# Patient Record
Sex: MALE | Race: WHITE | NOT HISPANIC OR LATINO | Employment: FULL TIME | URBAN - METROPOLITAN AREA
[De-identification: names, ages, dates, MRNs, and addresses within clinical notes are randomized per-mention and may not be internally consistent; named-entity substitution may affect disease eponyms.]

---

## 2017-03-01 ENCOUNTER — ALLSCRIPTS OFFICE VISIT (OUTPATIENT)
Dept: OTHER | Facility: OTHER | Age: 62
End: 2017-03-01

## 2017-03-01 ENCOUNTER — HOSPITAL ENCOUNTER (OUTPATIENT)
Dept: RADIOLOGY | Facility: MEDICAL CENTER | Age: 62
Discharge: HOME | End: 2017-03-01
Payer: COMMERCIAL

## 2017-03-01 DIAGNOSIS — M25.50 PAIN IN JOINT: ICD-10-CM

## 2017-03-01 DIAGNOSIS — M17.11 PRIMARY OSTEOARTHRITIS OF RIGHT KNEE: ICD-10-CM

## 2017-03-01 PROCEDURE — 73562 X-RAY EXAM OF KNEE 3: CPT

## 2017-05-02 ENCOUNTER — ALLSCRIPTS OFFICE VISIT (OUTPATIENT)
Dept: OTHER | Facility: OTHER | Age: 62
End: 2017-05-02

## 2017-09-08 ENCOUNTER — RX ONLY (RX ONLY)
Age: 62
End: 2017-09-08

## 2017-09-08 ENCOUNTER — DOCTOR'S OFFICE (OUTPATIENT)
Dept: URBAN - METROPOLITAN AREA CLINIC 137 | Facility: CLINIC | Age: 62
Setting detail: OPHTHALMOLOGY
End: 2017-09-08
Payer: COMMERCIAL

## 2017-09-08 ENCOUNTER — DOCTOR'S OFFICE (OUTPATIENT)
Dept: URBAN - NONMETROPOLITAN AREA CLINIC 1 | Facility: CLINIC | Age: 62
Setting detail: OPHTHALMOLOGY
End: 2017-09-08
Payer: COMMERCIAL

## 2017-09-08 DIAGNOSIS — H43.12: ICD-10-CM

## 2017-09-08 DIAGNOSIS — H25.13: ICD-10-CM

## 2017-09-08 DIAGNOSIS — H18.59: ICD-10-CM

## 2017-09-08 PROCEDURE — 92004 COMPRE OPH EXAM NEW PT 1/>: CPT | Performed by: OPHTHALMOLOGY

## 2017-09-08 PROCEDURE — NO CHARGE N/C PROFESSIONAL COURTESY: Performed by: OPHTHALMOLOGY

## 2017-09-08 PROCEDURE — 76512 OPH US DX B-SCAN: CPT | Performed by: OPHTHALMOLOGY

## 2017-09-08 ASSESSMENT — REFRACTION_AUTOREFRACTION
OS_SPHERE: +1.00
OD_CYLINDER: -1.50
OS_CYLINDER: -2.75
OS_AXIS: 145
OD_SPHERE: +0.75
OD_AXIS: 130

## 2017-09-08 ASSESSMENT — REFRACTION_CURRENTRX
OS_OVR_VA: 20/
OD_OVR_VA: 20/
OS_OVR_VA: 20/
OS_OVR_VA: 20/

## 2017-09-08 ASSESSMENT — REFRACTION_MANIFEST
OS_VA3: 20/
OS_VA1: 20/
OS_VA2: 20/
OS_VA2: 20/
OU_VA: 20/
OS_VA3: 20/
OD_VA3: 20/
OS_VA2: 20/
OD_VA2: 20/
OD_VA1: 20/
OU_VA: 20/
OU_VA: 20/
OD_VA1: 20/
OS_VA1: 20/
OS_VA1: 20/
OD_VA3: 20/
OD_VA1: 20/
OD_VA2: 20/
OD_VA2: 20/
OS_VA3: 20/
OD_VA3: 20/

## 2017-09-08 ASSESSMENT — SPHEQUIV_DERIVED
OD_SPHEQUIV: 0
OS_SPHEQUIV: -0.375

## 2017-09-08 ASSESSMENT — VISUAL ACUITY
OS_BCVA: 20/30
OD_BCVA: CF 1FT

## 2017-09-08 ASSESSMENT — CONFRONTATIONAL VISUAL FIELD TEST (CVF)
OD_FINDINGS: FULL
OS_FINDINGS: CONSTRICTION

## 2017-09-13 ENCOUNTER — DOCTOR'S OFFICE (OUTPATIENT)
Dept: URBAN - METROPOLITAN AREA CLINIC 136 | Facility: CLINIC | Age: 62
Setting detail: OPHTHALMOLOGY
End: 2017-09-13
Payer: COMMERCIAL

## 2017-09-13 DIAGNOSIS — H43.12: ICD-10-CM

## 2017-09-13 DIAGNOSIS — H25.13: ICD-10-CM

## 2017-09-13 DIAGNOSIS — H33.312: ICD-10-CM

## 2017-09-13 DIAGNOSIS — H18.59: ICD-10-CM

## 2017-09-13 PROCEDURE — 99213 OFFICE O/P EST LOW 20 MIN: CPT | Performed by: OPHTHALMOLOGY

## 2017-09-13 PROCEDURE — 67145 PROPH RTA DTCHMNT PC: CPT | Performed by: OPHTHALMOLOGY

## 2017-09-13 ASSESSMENT — REFRACTION_CURRENTRX
OD_OVR_VA: 20/
OD_OVR_VA: 20/
OS_OVR_VA: 20/
OS_OVR_VA: 20/
OD_OVR_VA: 20/
OS_OVR_VA: 20/

## 2017-09-13 ASSESSMENT — REFRACTION_MANIFEST
OD_VA3: 20/
OD_VA2: 20/
OD_VA3: 20/
OS_VA1: 20/
OS_VA2: 20/
OS_VA1: 20/
OS_VA3: 20/
OD_VA3: 20/
OS_VA3: 20/
OS_VA3: 20/
OD_VA1: 20/
OU_VA: 20/
OS_VA2: 20/
OU_VA: 20/
OD_VA1: 20/
OD_VA2: 20/
OD_VA1: 20/
OS_VA1: 20/
OU_VA: 20/
OS_VA2: 20/
OD_VA2: 20/

## 2017-09-13 ASSESSMENT — VISUAL ACUITY
OS_BCVA: 20/30
OD_BCVA: CF 1FT

## 2017-09-13 ASSESSMENT — REFRACTION_AUTOREFRACTION
OD_SPHERE: +0.75
OS_CYLINDER: -2.75
OS_AXIS: 145
OS_SPHERE: +1.00
OD_CYLINDER: -1.50
OD_AXIS: 130

## 2017-09-13 ASSESSMENT — CONFRONTATIONAL VISUAL FIELD TEST (CVF)
OS_FINDINGS: CONSTRICTION
OD_FINDINGS: FULL

## 2017-09-13 ASSESSMENT — SPHEQUIV_DERIVED
OS_SPHEQUIV: -0.375
OD_SPHEQUIV: 0

## 2017-09-14 ASSESSMENT — SPHEQUIV_DERIVED
OS_SPHEQUIV: -0.375
OD_SPHEQUIV: 0

## 2017-09-14 ASSESSMENT — REFRACTION_AUTOREFRACTION
OD_SPHERE: +0.75
OS_SPHERE: +1.00
OS_AXIS: 145
OD_CYLINDER: -1.50
OS_CYLINDER: -2.75
OD_AXIS: 130

## 2017-09-14 ASSESSMENT — REFRACTION_MANIFEST
OD_VA2: 20/
OD_VA2: 20/
OD_VA1: 20/
OU_VA: 20/
OS_VA3: 20/
OS_VA2: 20/
OD_VA1: 20/
OS_VA3: 20/
OS_VA3: 20/
OS_VA1: 20/
OS_VA1: 20/
OD_VA2: 20/
OS_VA2: 20/
OU_VA: 20/
OD_VA1: 20/
OS_VA1: 20/
OD_VA3: 20/
OU_VA: 20/
OS_VA2: 20/
OD_VA3: 20/
OD_VA3: 20/

## 2017-09-14 ASSESSMENT — REFRACTION_CURRENTRX
OS_OVR_VA: 20/
OD_OVR_VA: 20/
OS_OVR_VA: 20/
OD_OVR_VA: 20/
OS_OVR_VA: 20/
OD_OVR_VA: 20/

## 2017-09-14 ASSESSMENT — VISUAL ACUITY
OS_BCVA: 20/30-2
OD_BCVA: 20/40-2

## 2017-09-20 ENCOUNTER — DOCTOR'S OFFICE (OUTPATIENT)
Dept: URBAN - METROPOLITAN AREA CLINIC 136 | Facility: CLINIC | Age: 62
Setting detail: OPHTHALMOLOGY
End: 2017-09-20
Payer: COMMERCIAL

## 2017-09-20 DIAGNOSIS — H33.312: ICD-10-CM

## 2017-09-20 DIAGNOSIS — H43.12: ICD-10-CM

## 2017-09-20 DIAGNOSIS — H18.59: ICD-10-CM

## 2017-09-20 DIAGNOSIS — H25.13: ICD-10-CM

## 2017-09-20 PROCEDURE — 99024 POSTOP FOLLOW-UP VISIT: CPT | Performed by: OPHTHALMOLOGY

## 2017-09-20 ASSESSMENT — REFRACTION_AUTOREFRACTION
OS_CYLINDER: -2.75
OD_CYLINDER: -1.50
OD_AXIS: 130
OS_SPHERE: +1.00
OS_AXIS: 145
OD_SPHERE: +0.75

## 2017-09-20 ASSESSMENT — REFRACTION_MANIFEST
OU_VA: 20/
OD_VA3: 20/
OU_VA: 20/
OS_VA2: 20/
OS_VA1: 20/
OD_VA2: 20/
OD_VA1: 20/
OD_VA3: 20/
OU_VA: 20/
OD_VA3: 20/
OD_VA2: 20/
OD_VA1: 20/
OS_VA3: 20/
OS_VA1: 20/
OD_VA2: 20/
OS_VA2: 20/
OS_VA3: 20/
OD_VA1: 20/
OS_VA1: 20/
OS_VA3: 20/
OS_VA2: 20/

## 2017-09-20 ASSESSMENT — REFRACTION_CURRENTRX
OS_OVR_VA: 20/
OD_OVR_VA: 20/
OD_OVR_VA: 20/
OS_OVR_VA: 20/
OS_OVR_VA: 20/
OD_OVR_VA: 20/

## 2017-09-20 ASSESSMENT — VISUAL ACUITY
OS_BCVA: 20/
OD_BCVA: 20/20

## 2017-09-20 ASSESSMENT — SPHEQUIV_DERIVED
OD_SPHEQUIV: 0
OS_SPHEQUIV: -0.375

## 2017-10-11 ENCOUNTER — DOCTOR'S OFFICE (OUTPATIENT)
Dept: URBAN - METROPOLITAN AREA CLINIC 136 | Facility: CLINIC | Age: 62
Setting detail: OPHTHALMOLOGY
End: 2017-10-11
Payer: COMMERCIAL

## 2017-10-11 ENCOUNTER — RX ONLY (RX ONLY)
Age: 62
End: 2017-10-11

## 2017-10-11 DIAGNOSIS — H33.312: ICD-10-CM

## 2017-10-11 PROBLEM — H25.13 CATARACT NUCLEAR SCLEROSIS; BOTH EYES: Status: ACTIVE | Noted: 2017-09-08

## 2017-10-11 PROBLEM — H18.59 ANTERIOR CORNEAL DYSTROPHIES, OTHER: Status: ACTIVE | Noted: 2017-09-08

## 2017-10-11 PROBLEM — H43.12 VITREOUS HEMORRHAGE; LEFT EYE: Status: ACTIVE | Noted: 2017-09-08

## 2017-10-11 PROCEDURE — 99024 POSTOP FOLLOW-UP VISIT: CPT | Performed by: OPHTHALMOLOGY

## 2017-10-11 ASSESSMENT — REFRACTION_MANIFEST
OS_VA2: 20/
OD_VA2: 20/
OS_VA3: 20/
OD_VA1: 20/
OS_VA2: 20/
OS_VA3: 20/
OD_VA3: 20/
OS_VA1: 20/
OD_VA2: 20/
OD_VA3: 20/
OS_VA1: 20/
OU_VA: 20/
OS_VA3: 20/
OU_VA: 20/
OS_VA2: 20/
OS_VA1: 20/
OU_VA: 20/
OD_VA1: 20/
OD_VA2: 20/
OD_VA1: 20/
OD_VA3: 20/

## 2017-10-11 ASSESSMENT — REFRACTION_AUTOREFRACTION
OD_SPHERE: +0.75
OD_AXIS: 130
OS_SPHERE: +1.00
OS_AXIS: 145
OD_CYLINDER: -1.50
OS_CYLINDER: -2.75

## 2017-10-11 ASSESSMENT — REFRACTION_CURRENTRX
OD_OVR_VA: 20/
OS_OVR_VA: 20/
OD_OVR_VA: 20/
OS_OVR_VA: 20/
OD_OVR_VA: 20/
OS_OVR_VA: 20/

## 2017-10-11 ASSESSMENT — VISUAL ACUITY
OD_BCVA: 20/20
OS_BCVA: 20/

## 2017-10-11 ASSESSMENT — SPHEQUIV_DERIVED
OD_SPHEQUIV: 0
OS_SPHEQUIV: -0.375

## 2017-10-11 ASSESSMENT — CONFRONTATIONAL VISUAL FIELD TEST (CVF)
OD_FINDINGS: FULL
OS_FINDINGS: FULL

## 2018-01-13 VITALS
SYSTOLIC BLOOD PRESSURE: 155 MMHG | BODY MASS INDEX: 27.08 KG/M2 | DIASTOLIC BLOOD PRESSURE: 93 MMHG | HEIGHT: 74 IN | WEIGHT: 211 LBS | HEART RATE: 94 BPM

## 2018-01-14 VITALS
WEIGHT: 211 LBS | HEIGHT: 74 IN | BODY MASS INDEX: 27.08 KG/M2 | HEART RATE: 109 BPM | SYSTOLIC BLOOD PRESSURE: 166 MMHG | DIASTOLIC BLOOD PRESSURE: 98 MMHG

## 2020-01-09 ENCOUNTER — HOSPITAL ENCOUNTER (INPATIENT)
Facility: HOSPITAL | Age: 65
LOS: 1 days | Discharge: HOME | DRG: 343 | End: 2020-01-10
Attending: EMERGENCY MEDICINE | Admitting: INTERNAL MEDICINE
Payer: COMMERCIAL

## 2020-01-09 ENCOUNTER — APPOINTMENT (EMERGENCY)
Dept: CT IMAGING | Facility: HOSPITAL | Age: 65
DRG: 343 | End: 2020-01-09
Payer: COMMERCIAL

## 2020-01-09 ENCOUNTER — ANESTHESIA (INPATIENT)
Dept: PERIOP | Facility: HOSPITAL | Age: 65
DRG: 343 | End: 2020-01-09
Payer: COMMERCIAL

## 2020-01-09 ENCOUNTER — ANESTHESIA EVENT (INPATIENT)
Dept: PERIOP | Facility: HOSPITAL | Age: 65
DRG: 343 | End: 2020-01-09
Payer: COMMERCIAL

## 2020-01-09 ENCOUNTER — APPOINTMENT (EMERGENCY)
Dept: RADIOLOGY | Facility: HOSPITAL | Age: 65
DRG: 343 | End: 2020-01-09
Payer: COMMERCIAL

## 2020-01-09 DIAGNOSIS — K37 APPENDICITIS: Primary | ICD-10-CM

## 2020-01-09 PROBLEM — K35.80 ACUTE APPENDICITIS: Status: ACTIVE | Noted: 2020-01-09

## 2020-01-09 LAB
ABO GROUP BLD: NORMAL
ALBUMIN SERPL BCP-MCNC: 3.8 G/DL (ref 3.5–5)
ALP SERPL-CCNC: 68 U/L (ref 46–116)
ALT SERPL W P-5'-P-CCNC: 26 U/L (ref 12–78)
ANION GAP SERPL CALCULATED.3IONS-SCNC: 6 MMOL/L (ref 4–13)
APTT PPP: 24 SECONDS (ref 23–37)
AST SERPL W P-5'-P-CCNC: 15 U/L (ref 5–45)
BASOPHILS # BLD AUTO: 0.03 THOUSANDS/ΜL (ref 0–0.1)
BASOPHILS NFR BLD AUTO: 0 % (ref 0–1)
BILIRUB SERPL-MCNC: 0.56 MG/DL (ref 0.2–1)
BILIRUB UR QL STRIP: NEGATIVE
BLD GP AB SCN SERPL QL: NEGATIVE
BUN SERPL-MCNC: 13 MG/DL (ref 5–25)
CALCIUM SERPL-MCNC: 8.3 MG/DL (ref 8.3–10.1)
CHLORIDE SERPL-SCNC: 104 MMOL/L (ref 100–108)
CLARITY UR: CLEAR
CO2 SERPL-SCNC: 31 MMOL/L (ref 21–32)
COLOR UR: YELLOW
CREAT SERPL-MCNC: 1.01 MG/DL (ref 0.6–1.3)
EOSINOPHIL # BLD AUTO: 0.01 THOUSAND/ΜL (ref 0–0.61)
EOSINOPHIL NFR BLD AUTO: 0 % (ref 0–6)
ERYTHROCYTE [DISTWIDTH] IN BLOOD BY AUTOMATED COUNT: 12.5 % (ref 11.6–15.1)
GFR SERPL CREATININE-BSD FRML MDRD: 79 ML/MIN/1.73SQ M
GLUCOSE SERPL-MCNC: 170 MG/DL (ref 65–140)
GLUCOSE SERPL-MCNC: 197 MG/DL (ref 65–140)
GLUCOSE UR STRIP-MCNC: NEGATIVE MG/DL
HCT VFR BLD AUTO: 44.1 % (ref 36.5–49.3)
HGB BLD-MCNC: 14.6 G/DL (ref 12–17)
HGB UR QL STRIP.AUTO: NEGATIVE
IMM GRANULOCYTES # BLD AUTO: 0.02 THOUSAND/UL (ref 0–0.2)
IMM GRANULOCYTES NFR BLD AUTO: 0 % (ref 0–2)
INR PPP: 1 (ref 0.84–1.19)
KETONES UR STRIP-MCNC: NEGATIVE MG/DL
LACTATE SERPL-SCNC: 1.3 MMOL/L (ref 0.5–2)
LEUKOCYTE ESTERASE UR QL STRIP: NEGATIVE
LIPASE SERPL-CCNC: 53 U/L (ref 73–393)
LYMPHOCYTES # BLD AUTO: 0.79 THOUSANDS/ΜL (ref 0.6–4.47)
LYMPHOCYTES NFR BLD AUTO: 9 % (ref 14–44)
MCH RBC QN AUTO: 30.4 PG (ref 26.8–34.3)
MCHC RBC AUTO-ENTMCNC: 33.1 G/DL (ref 31.4–37.4)
MCV RBC AUTO: 92 FL (ref 82–98)
MONOCYTES # BLD AUTO: 0.53 THOUSAND/ΜL (ref 0.17–1.22)
MONOCYTES NFR BLD AUTO: 6 % (ref 4–12)
NEUTROPHILS # BLD AUTO: 7.34 THOUSANDS/ΜL (ref 1.85–7.62)
NEUTS SEG NFR BLD AUTO: 85 % (ref 43–75)
NITRITE UR QL STRIP: NEGATIVE
NRBC BLD AUTO-RTO: 0 /100 WBCS
PH UR STRIP.AUTO: 7 [PH]
PLATELET # BLD AUTO: 167 THOUSANDS/UL (ref 149–390)
PLATELET # BLD AUTO: 169 THOUSANDS/UL (ref 149–390)
PMV BLD AUTO: 10 FL (ref 8.9–12.7)
PMV BLD AUTO: 10.1 FL (ref 8.9–12.7)
POTASSIUM SERPL-SCNC: 4.2 MMOL/L (ref 3.5–5.3)
PROT SERPL-MCNC: 7.5 G/DL (ref 6.4–8.2)
PROT UR STRIP-MCNC: NEGATIVE MG/DL
PROTHROMBIN TIME: 13.2 SECONDS (ref 11.6–14.5)
RBC # BLD AUTO: 4.81 MILLION/UL (ref 3.88–5.62)
RH BLD: POSITIVE
SODIUM SERPL-SCNC: 141 MMOL/L (ref 136–145)
SP GR UR STRIP.AUTO: 1.02 (ref 1–1.03)
SPECIMEN EXPIRATION DATE: NORMAL
UROBILINOGEN UR QL STRIP.AUTO: 0.2 E.U./DL
WBC # BLD AUTO: 8.72 THOUSAND/UL (ref 4.31–10.16)

## 2020-01-09 PROCEDURE — 99285 EMERGENCY DEPT VISIT HI MDM: CPT | Performed by: EMERGENCY MEDICINE

## 2020-01-09 PROCEDURE — 85610 PROTHROMBIN TIME: CPT | Performed by: EMERGENCY MEDICINE

## 2020-01-09 PROCEDURE — 96375 TX/PRO/DX INJ NEW DRUG ADDON: CPT

## 2020-01-09 PROCEDURE — 99223 1ST HOSP IP/OBS HIGH 75: CPT | Performed by: NURSE PRACTITIONER

## 2020-01-09 PROCEDURE — 81003 URINALYSIS AUTO W/O SCOPE: CPT | Performed by: EMERGENCY MEDICINE

## 2020-01-09 PROCEDURE — 36415 COLL VENOUS BLD VENIPUNCTURE: CPT | Performed by: EMERGENCY MEDICINE

## 2020-01-09 PROCEDURE — 82948 REAGENT STRIP/BLOOD GLUCOSE: CPT

## 2020-01-09 PROCEDURE — 85730 THROMBOPLASTIN TIME PARTIAL: CPT | Performed by: EMERGENCY MEDICINE

## 2020-01-09 PROCEDURE — 85025 COMPLETE CBC W/AUTO DIFF WBC: CPT | Performed by: EMERGENCY MEDICINE

## 2020-01-09 PROCEDURE — 44970 LAPAROSCOPY APPENDECTOMY: CPT | Performed by: PHYSICIAN ASSISTANT

## 2020-01-09 PROCEDURE — 99285 EMERGENCY DEPT VISIT HI MDM: CPT

## 2020-01-09 PROCEDURE — 86900 BLOOD TYPING SEROLOGIC ABO: CPT | Performed by: EMERGENCY MEDICINE

## 2020-01-09 PROCEDURE — 88304 TISSUE EXAM BY PATHOLOGIST: CPT | Performed by: PATHOLOGY

## 2020-01-09 PROCEDURE — 83605 ASSAY OF LACTIC ACID: CPT | Performed by: EMERGENCY MEDICINE

## 2020-01-09 PROCEDURE — 85049 AUTOMATED PLATELET COUNT: CPT | Performed by: SURGERY

## 2020-01-09 PROCEDURE — 74176 CT ABD & PELVIS W/O CONTRAST: CPT

## 2020-01-09 PROCEDURE — 96374 THER/PROPH/DIAG INJ IV PUSH: CPT

## 2020-01-09 PROCEDURE — 80053 COMPREHEN METABOLIC PANEL: CPT | Performed by: EMERGENCY MEDICINE

## 2020-01-09 PROCEDURE — 86850 RBC ANTIBODY SCREEN: CPT | Performed by: EMERGENCY MEDICINE

## 2020-01-09 PROCEDURE — 0DTJ4ZZ RESECTION OF APPENDIX, PERCUTANEOUS ENDOSCOPIC APPROACH: ICD-10-PCS | Performed by: SURGERY

## 2020-01-09 PROCEDURE — 93005 ELECTROCARDIOGRAM TRACING: CPT

## 2020-01-09 PROCEDURE — 83690 ASSAY OF LIPASE: CPT | Performed by: EMERGENCY MEDICINE

## 2020-01-09 PROCEDURE — 86901 BLOOD TYPING SEROLOGIC RH(D): CPT | Performed by: EMERGENCY MEDICINE

## 2020-01-09 PROCEDURE — 71046 X-RAY EXAM CHEST 2 VIEWS: CPT

## 2020-01-09 PROCEDURE — 96361 HYDRATE IV INFUSION ADD-ON: CPT

## 2020-01-09 RX ORDER — PROPOFOL 10 MG/ML
INJECTION, EMULSION INTRAVENOUS AS NEEDED
Status: DISCONTINUED | OUTPATIENT
Start: 2020-01-09 | End: 2020-01-09 | Stop reason: SURG

## 2020-01-09 RX ORDER — BUPIVACAINE HYDROCHLORIDE AND EPINEPHRINE 2.5; 5 MG/ML; UG/ML
INJECTION, SOLUTION INFILTRATION; PERINEURAL AS NEEDED
Status: DISCONTINUED | OUTPATIENT
Start: 2020-01-09 | End: 2020-01-09 | Stop reason: HOSPADM

## 2020-01-09 RX ORDER — FENTANYL CITRATE/PF 50 MCG/ML
50 SYRINGE (ML) INJECTION
Status: DISCONTINUED | OUTPATIENT
Start: 2020-01-09 | End: 2020-01-09

## 2020-01-09 RX ORDER — NEOSTIGMINE METHYLSULFATE 1 MG/ML
INJECTION INTRAVENOUS AS NEEDED
Status: DISCONTINUED | OUTPATIENT
Start: 2020-01-09 | End: 2020-01-09 | Stop reason: SURG

## 2020-01-09 RX ORDER — KETOROLAC TROMETHAMINE 30 MG/ML
15 INJECTION, SOLUTION INTRAMUSCULAR; INTRAVENOUS EVERY 6 HOURS PRN
Status: DISCONTINUED | OUTPATIENT
Start: 2020-01-09 | End: 2020-01-09

## 2020-01-09 RX ORDER — GLYCOPYRROLATE 0.2 MG/ML
INJECTION INTRAMUSCULAR; INTRAVENOUS AS NEEDED
Status: DISCONTINUED | OUTPATIENT
Start: 2020-01-09 | End: 2020-01-09 | Stop reason: SURG

## 2020-01-09 RX ORDER — LIDOCAINE HYDROCHLORIDE 10 MG/ML
INJECTION, SOLUTION EPIDURAL; INFILTRATION; INTRACAUDAL; PERINEURAL AS NEEDED
Status: DISCONTINUED | OUTPATIENT
Start: 2020-01-09 | End: 2020-01-09 | Stop reason: SURG

## 2020-01-09 RX ORDER — ROCURONIUM BROMIDE 10 MG/ML
INJECTION, SOLUTION INTRAVENOUS AS NEEDED
Status: DISCONTINUED | OUTPATIENT
Start: 2020-01-09 | End: 2020-01-09 | Stop reason: SURG

## 2020-01-09 RX ORDER — FENTANYL CITRATE 50 UG/ML
INJECTION, SOLUTION INTRAMUSCULAR; INTRAVENOUS AS NEEDED
Status: DISCONTINUED | OUTPATIENT
Start: 2020-01-09 | End: 2020-01-09 | Stop reason: SURG

## 2020-01-09 RX ORDER — KETOROLAC TROMETHAMINE 30 MG/ML
15 INJECTION, SOLUTION INTRAMUSCULAR; INTRAVENOUS EVERY 6 HOURS SCHEDULED
Status: DISCONTINUED | OUTPATIENT
Start: 2020-01-09 | End: 2020-01-10 | Stop reason: HOSPADM

## 2020-01-09 RX ORDER — KETOROLAC TROMETHAMINE 30 MG/ML
15 INJECTION, SOLUTION INTRAMUSCULAR; INTRAVENOUS ONCE
Status: COMPLETED | OUTPATIENT
Start: 2020-01-09 | End: 2020-01-09

## 2020-01-09 RX ORDER — ONDANSETRON 2 MG/ML
INJECTION INTRAMUSCULAR; INTRAVENOUS AS NEEDED
Status: DISCONTINUED | OUTPATIENT
Start: 2020-01-09 | End: 2020-01-09 | Stop reason: SURG

## 2020-01-09 RX ORDER — HYDROMORPHONE HCL/PF 1 MG/ML
1 SYRINGE (ML) INJECTION ONCE
Status: COMPLETED | OUTPATIENT
Start: 2020-01-09 | End: 2020-01-09

## 2020-01-09 RX ORDER — HYDROMORPHONE HCL 110MG/55ML
0.5 PATIENT CONTROLLED ANALGESIA SYRINGE INTRAVENOUS
Status: DISCONTINUED | OUTPATIENT
Start: 2020-01-09 | End: 2020-01-09

## 2020-01-09 RX ORDER — KETOROLAC TROMETHAMINE 30 MG/ML
INJECTION, SOLUTION INTRAMUSCULAR; INTRAVENOUS AS NEEDED
Status: DISCONTINUED | OUTPATIENT
Start: 2020-01-09 | End: 2020-01-09 | Stop reason: SURG

## 2020-01-09 RX ORDER — OXYCODONE HYDROCHLORIDE AND ACETAMINOPHEN 5; 325 MG/1; MG/1
1 TABLET ORAL EVERY 4 HOURS PRN
Status: DISCONTINUED | OUTPATIENT
Start: 2020-01-09 | End: 2020-01-10 | Stop reason: HOSPADM

## 2020-01-09 RX ORDER — ONDANSETRON 2 MG/ML
4 INJECTION INTRAMUSCULAR; INTRAVENOUS EVERY 6 HOURS PRN
Status: DISCONTINUED | OUTPATIENT
Start: 2020-01-09 | End: 2020-01-09

## 2020-01-09 RX ORDER — HEPARIN SODIUM 5000 [USP'U]/ML
5000 INJECTION, SOLUTION INTRAVENOUS; SUBCUTANEOUS EVERY 8 HOURS SCHEDULED
Status: DISCONTINUED | OUTPATIENT
Start: 2020-01-09 | End: 2020-01-10 | Stop reason: HOSPADM

## 2020-01-09 RX ORDER — SODIUM CHLORIDE 9 MG/ML
100 INJECTION, SOLUTION INTRAVENOUS CONTINUOUS
Status: DISCONTINUED | OUTPATIENT
Start: 2020-01-09 | End: 2020-01-10 | Stop reason: HOSPADM

## 2020-01-09 RX ORDER — PROMETHAZINE HYDROCHLORIDE 25 MG/ML
12.5 INJECTION, SOLUTION INTRAMUSCULAR; INTRAVENOUS ONCE AS NEEDED
Status: DISCONTINUED | OUTPATIENT
Start: 2020-01-09 | End: 2020-01-09

## 2020-01-09 RX ORDER — HYDROMORPHONE HCL/PF 1 MG/ML
0.5 SYRINGE (ML) INJECTION EVERY 4 HOURS PRN
Status: DISCONTINUED | OUTPATIENT
Start: 2020-01-09 | End: 2020-01-09

## 2020-01-09 RX ORDER — ONDANSETRON 2 MG/ML
4 INJECTION INTRAMUSCULAR; INTRAVENOUS ONCE
Status: COMPLETED | OUTPATIENT
Start: 2020-01-09 | End: 2020-01-09

## 2020-01-09 RX ORDER — ONDANSETRON 2 MG/ML
4 INJECTION INTRAMUSCULAR; INTRAVENOUS EVERY 6 HOURS PRN
Status: DISCONTINUED | OUTPATIENT
Start: 2020-01-09 | End: 2020-01-10 | Stop reason: HOSPADM

## 2020-01-09 RX ORDER — MAGNESIUM HYDROXIDE 1200 MG/15ML
LIQUID ORAL AS NEEDED
Status: DISCONTINUED | OUTPATIENT
Start: 2020-01-09 | End: 2020-01-09 | Stop reason: HOSPADM

## 2020-01-09 RX ORDER — MULTIVITAMIN
1 TABLET ORAL DAILY
COMMUNITY

## 2020-01-09 RX ORDER — DEXAMETHASONE SODIUM PHOSPHATE 10 MG/ML
INJECTION, SOLUTION INTRAMUSCULAR; INTRAVENOUS AS NEEDED
Status: DISCONTINUED | OUTPATIENT
Start: 2020-01-09 | End: 2020-01-09 | Stop reason: SURG

## 2020-01-09 RX ADMIN — PHENYLEPHRINE HYDROCHLORIDE 50 MCG: 10 INJECTION INTRAVENOUS at 16:03

## 2020-01-09 RX ADMIN — DEXAMETHASONE SODIUM PHOSPHATE 4 MG: 10 INJECTION, SOLUTION INTRAMUSCULAR; INTRAVENOUS at 15:33

## 2020-01-09 RX ADMIN — SODIUM CHLORIDE 100 ML/HR: 0.9 INJECTION, SOLUTION INTRAVENOUS at 20:29

## 2020-01-09 RX ADMIN — LIDOCAINE HYDROCHLORIDE 100 MG: 10 INJECTION, SOLUTION EPIDURAL; INFILTRATION; INTRACAUDAL; PERINEURAL at 15:30

## 2020-01-09 RX ADMIN — GLYCOPYRROLATE 0.4 MG: 0.2 INJECTION, SOLUTION INTRAMUSCULAR; INTRAVENOUS at 16:15

## 2020-01-09 RX ADMIN — FENTANYL CITRATE 50 MCG: 50 INJECTION, SOLUTION INTRAMUSCULAR; INTRAVENOUS at 15:55

## 2020-01-09 RX ADMIN — KETOROLAC TROMETHAMINE 15 MG: 30 INJECTION, SOLUTION INTRAMUSCULAR at 01:03

## 2020-01-09 RX ADMIN — Medication 3 G: at 03:18

## 2020-01-09 RX ADMIN — KETOROLAC TROMETHAMINE 15 MG: 30 INJECTION, SOLUTION INTRAMUSCULAR at 23:59

## 2020-01-09 RX ADMIN — NEOSTIGMINE METHYLSULFATE 3 MG: 1 INJECTION, SOLUTION INTRAVENOUS at 16:15

## 2020-01-09 RX ADMIN — PROPOFOL 200 MG: 10 INJECTION, EMULSION INTRAVENOUS at 15:30

## 2020-01-09 RX ADMIN — SODIUM CHLORIDE: 0.9 INJECTION, SOLUTION INTRAVENOUS at 15:39

## 2020-01-09 RX ADMIN — PIPERACILLIN SODIUM AND TAZOBACTAM SODIUM 3.38 G: 36; 4.5 INJECTION, POWDER, FOR SOLUTION INTRAVENOUS at 08:22

## 2020-01-09 RX ADMIN — ROCURONIUM BROMIDE 50 MG: 10 INJECTION, SOLUTION INTRAVENOUS at 15:30

## 2020-01-09 RX ADMIN — ONDANSETRON 4 MG: 2 INJECTION INTRAMUSCULAR; INTRAVENOUS at 01:02

## 2020-01-09 RX ADMIN — PIPERACILLIN SODIUM AND TAZOBACTAM SODIUM 3.38 G: 36; 4.5 INJECTION, POWDER, FOR SOLUTION INTRAVENOUS at 15:57

## 2020-01-09 RX ADMIN — KETOROLAC TROMETHAMINE 15 MG: 30 INJECTION, SOLUTION INTRAMUSCULAR at 16:22

## 2020-01-09 RX ADMIN — HYDROMORPHONE HYDROCHLORIDE 1 MG: 1 INJECTION, SOLUTION INTRAMUSCULAR; INTRAVENOUS; SUBCUTANEOUS at 01:05

## 2020-01-09 RX ADMIN — HEPARIN SODIUM 5000 UNITS: 5000 INJECTION INTRAVENOUS; SUBCUTANEOUS at 22:50

## 2020-01-09 RX ADMIN — FENTANYL CITRATE 100 MCG: 50 INJECTION, SOLUTION INTRAMUSCULAR; INTRAVENOUS at 15:28

## 2020-01-09 RX ADMIN — SODIUM CHLORIDE 100 ML/HR: 0.9 INJECTION, SOLUTION INTRAVENOUS at 07:33

## 2020-01-09 RX ADMIN — ONDANSETRON 4 MG: 2 INJECTION INTRAMUSCULAR; INTRAVENOUS at 15:57

## 2020-01-09 RX ADMIN — SODIUM CHLORIDE 1000 ML: 0.9 INJECTION, SOLUTION INTRAVENOUS at 01:04

## 2020-01-09 NOTE — ED PROVIDER NOTES
History  Chief Complaint   Patient presents with    Abdominal Pain     Patient has right sided abdominal pain for the last several hours  Patient denies NVD at this time  RUQ abdominal pain with chills, was intermittent, recurred and now constant  Began last night around 1900  No nausea or vomiting  Urinated normally, no hematuria  Denies diarrhea or constipation  Past few days has had an upper respiratory infection/cold    Past medical history kidney stones  Surgical history herniorrhaphy  Social history denies tobacco and illicits, occasional alcohol      History provided by:  Patient  Abdominal Pain   Pain location:  RUQ  Pain quality: sharp    Pain radiates to:  Does not radiate  Pain severity:  Severe  Onset quality:  Sudden  Timing:  Constant  Chronicity:  New  Relieved by:  None tried  Associated symptoms: chills    Associated symptoms: no chest pain, no constipation, no diarrhea, no dysuria, no fever, no hematuria, no nausea, no shortness of breath and no vomiting        Prior to Admission Medications   Prescriptions Last Dose Informant Patient Reported? Taking? Multiple Vitamin (MULTIVITAMIN) tablet   Yes Yes   Sig: Take 1 tablet by mouth daily      Facility-Administered Medications: None       History reviewed  No pertinent past medical history  Past Surgical History:   Procedure Laterality Date    HERNIA REPAIR         History reviewed  No pertinent family history  I have reviewed and agree with the history as documented  Social History     Tobacco Use    Smoking status: Never Smoker    Smokeless tobacco: Never Used   Substance Use Topics    Alcohol use: Yes    Drug use: Never        Review of Systems   Constitutional: Positive for chills  Negative for fever  Respiratory: Negative for shortness of breath  Cardiovascular: Negative for chest pain  Gastrointestinal: Positive for abdominal pain  Negative for constipation, diarrhea, nausea and vomiting     Genitourinary: Negative for dysuria and hematuria  All other systems reviewed and are negative  Physical Exam  Physical Exam   Constitutional: He is oriented to person, place, and time  Non-toxic appearance  He does not appear ill  HENT:   Head: Normocephalic and atraumatic  Mouth/Throat: Oropharynx is clear and moist    Eyes: Pupils are equal, round, and reactive to light  Conjunctivae and EOM are normal    Neck: Neck supple  Cardiovascular: Normal rate, regular rhythm and normal heart sounds  Pulmonary/Chest: Effort normal and breath sounds normal    Abdominal: Soft  Normal appearance and bowel sounds are normal  He exhibits no distension  There is tenderness in the right upper quadrant  There is guarding  There is no rigidity, no rebound and no CVA tenderness  No hernia  Genitourinary: Testes normal and penis normal    Musculoskeletal: Normal range of motion  Neurological: He is alert and oriented to person, place, and time  No cranial nerve deficit  Coordination normal    Skin: Skin is warm and dry  Psychiatric: He has a normal mood and affect  His behavior is normal  Judgment and thought content normal    Nursing note and vitals reviewed        Vital Signs  ED Triage Vitals [01/09/20 0041]   Temperature Pulse Respirations Blood Pressure SpO2   98 1 °F (36 7 °C) (!) 106 18 165/97 95 %      Temp Source Heart Rate Source Patient Position - Orthostatic VS BP Location FiO2 (%)   Oral Monitor Lying Left arm --      Pain Score       8           Vitals:    01/09/20 0041 01/09/20 0230   BP: 165/97 119/70   Pulse: (!) 106 94   Patient Position - Orthostatic VS: Lying Lying         Visual Acuity      ED Medications  Medications   ampicillin-sulbactam (UNASYN) 3 g in sodium chloride 0 9 % 100 mL IVPB (has no administration in time range)   ondansetron (ZOFRAN) injection 4 mg (4 mg Intravenous Given 1/9/20 0102)   sodium chloride 0 9 % bolus 1,000 mL (0 mL Intravenous Stopped 1/9/20 0210)   HYDROmorphone (DILAUDID) injection 1 mg (1 mg Intravenous Given 1/9/20 0105)   ketorolac (TORADOL) injection 15 mg (15 mg Intravenous Given 1/9/20 0103)       Diagnostic Studies  Results Reviewed     Procedure Component Value Units Date/Time    Lactic acid, plasma x2 [[id number]]  (Normal) Collected:  01/09/20 0101    Lab Status:  Final result Specimen:  Blood from Arm, Right Updated:  01/09/20 0132     LACTIC ACID 1 3 mmol/L     Narrative:       Result may be elevated if tourniquet was used during collection      Lipase [[id number]]  (Abnormal) Collected:  01/09/20 0101    Lab Status:  Final result Specimen:  Blood from Arm, Right Updated:  01/09/20 0128     Lipase 53 u/L     Comprehensive metabolic panel [[id number]]  (Abnormal) Collected:  01/09/20 0101    Lab Status:  Final result Specimen:  Blood from Arm, Right Updated:  01/09/20 0128     Sodium 141 mmol/L      Potassium 4 2 mmol/L      Chloride 104 mmol/L      CO2 31 mmol/L      ANION GAP 6 mmol/L      BUN 13 mg/dL      Creatinine 1 01 mg/dL      Glucose 170 mg/dL      Calcium 8 3 mg/dL      AST 15 U/L      ALT 26 U/L      Alkaline Phosphatase 68 U/L      Total Protein 7 5 g/dL      Albumin 3 8 g/dL      Total Bilirubin 0 56 mg/dL      eGFR 79 ml/min/1 73sq m     Narrative:       [place] guidelines for Chronic Kidney Disease (CKD):     Stage 1 with normal or high GFR (GFR > 90 mL/min/1 73 square meters)    Stage 2 Mild CKD (GFR = 60-89 mL/min/1 73 square meters)    Stage 3A Moderate CKD (GFR = 45-59 mL/min/1 73 square meters)    Stage 3B Moderate CKD (GFR = 30-44 mL/min/1 73 square meters)    Stage 4 Severe CKD (GFR = 15-29 mL/min/1 73 square meters)    Stage 5 End Stage CKD (GFR <15 mL/min/1 73 square meters)  Note: GFR calculation is accurate only with a steady state creatinine    CBC and differential [[id number]]  (Abnormal) Collected:  01/09/20 0101    Lab Status:  Final result Specimen:  Blood from Arm, Right Updated:  01/09/20 0116     WBC 8 72 Thousand/uL      RBC 4 81 Million/uL      Hemoglobin 14 6 g/dL      Hematocrit 44 1 %      MCV 92 fL      MCH 30 4 pg      MCHC 33 1 g/dL      RDW 12 5 %      MPV 10 0 fL      Platelets 783 Thousands/uL      nRBC 0 /100 WBCs      Neutrophils Relative 85 %      Immat GRANS % 0 %      Lymphocytes Relative 9 %      Monocytes Relative 6 %      Eosinophils Relative 0 %      Basophils Relative 0 %      Neutrophils Absolute 7 34 Thousands/µL      Immature Grans Absolute 0 02 Thousand/uL      Lymphocytes Absolute 0 79 Thousands/µL      Monocytes Absolute 0 53 Thousand/µL      Eosinophils Absolute 0 01 Thousand/µL      Basophils Absolute 0 03 Thousands/µL     Lactic acid, plasma x2 [[id number]]     Lab Status:  No result Specimen:  Blood     UA w Reflex to Microscopic w Reflex to Culture [[id number]]     Lab Status:  No result Specimen:  Urine                  CT abdomen pelvis wo contrast   ED Interpretation by Apolonio Leyden, DO (01/09 0142)   CXR NAD      Final Result by Annamarie Begum MD (01/09 6379)      1  Abnormally dilated appendix with extensive periappendiceal fat stranding and phlegmonous changes in keeping with acute appendicitis  Further evaluation is hampered due to lack of intravenous or oral contrast   No fluid collection to suggest abscess  2   Punctate nonobstructing bilateral renal calculi         I personally discussed this study with Ping Toni on 1/9/2020 at 2:11 AM                   Workstation performed: [id number]         XR chest 2 views    (Results Pending)              Procedures  Procedures         ED Course  ED Course as of Jan 09 0256   Thu Jan 09, 2020   0216 Radiologist called +appendicitis, text Alfie Cuevas      [phone] [facility] requests hospitalist admit  [facility] aware, admitting                                  MDM      Disposition  Final diagnoses:   Appendicitis     Time reflects when diagnosis was documented in both MDM as applicable and the Disposition within this note     Time User Action Codes Description Comment    1/9/2020  2:56 AM Steven Crissy Add [K37] Appendicitis       ED Disposition     ED Disposition Condition Date/Time Comment    Admit Stable Thu Jan 9, 2020  2:53 AM Case was discussed with SANDRA and the patient's admission status was agreed to be Admission Status: inpatient status to the service of Dr Vero Dolan   Follow-up Information    None         Patient's Medications   Discharge Prescriptions    No medications on file     No discharge procedures on file      ED Provider  Electronically Signed by           Maria Luz Whitmore DO  01/09/20 3281

## 2020-01-09 NOTE — H&P
[facility] Internal Medicine    H&P- Janette Leonard 1955, 62 y o  male MRN: [id number]    Unit/Bed#: ED 04 Encounter: [id number]    Primary Care Provider: Samina Allison DO   Date and time admitted to hospital: 1/9/2020 12:39 AM        Acute appendicitis  Assessment & Plan  · Right sided abdominal pain times several hours  · CT abdomen pelvis:  Acute appendicitis  · Surgery consult (aware)  · NPO  · IV fluids  · Pain medication  · Unasyn given in the ER  · Chest x-ray, EKG, and type and screen pending        VTE Prophylaxis: Pharmacologic VTE Prophylaxis contraindicated due to Will hold off due to pending surgery  / sequential compression device   Code Status:  Full  POLST: POLST form is not discussed and not completed at this time  Discussion with family:  Patient    Anticipated Length of Stay:  Patient will be admitted on an Inpatient basis with an anticipated length of stay of  greater than 2 midnights  Justification for Hospital Stay:  Acute appendicitis    Total Time for Visit, including Counseling / Coordination of Care: 30 minutes  Greater than 50% of this total time spent on direct patient counseling and coordination of care  Chief Complaint:   Right started abdominal pain    History of Present Illness:    Janette Leonard is a 62 y o  male with a history of kidney stones  who presents with several hours of right-sided abdominal pain  No nausea or vomiting  He said that it did not feel like his kidney stone, but was just as intense  He did not try any treatment, he came to the ER for evaluation  Denies chest pain, shortness of breath, dysuria, dizziness, focal weakness  Review of Systems:    Review of Systems   Constitutional: Negative for chills and fever  Eyes: Negative for visual disturbance  Respiratory: Negative for cough and shortness of breath  Cardiovascular: Negative for chest pain, palpitations and leg swelling     Gastrointestinal: Positive for abdominal pain and nausea  Negative for abdominal distention, blood in stool, constipation, diarrhea and vomiting  Genitourinary: Negative for dysuria and flank pain  Musculoskeletal: Negative for arthralgias and myalgias  Skin: Negative for pallor and rash  Neurological: Negative for dizziness, seizures, syncope, weakness, numbness and headaches  All other systems reviewed and are negative  Past Medical and Surgical History:     History reviewed  No pertinent past medical history  Past Surgical History:   Procedure Laterality Date    HERNIA REPAIR         Meds/Allergies:    Prior to Admission medications    Medication Sig Start Date End Date Taking? Authorizing Provider   Multiple Vitamin (MULTIVITAMIN) tablet Take 1 tablet by mouth daily   Yes Historical Provider, MD     I have reviewed home medications with patient personally  Allergies: No Known Allergies    Social History:     Marital Status: /Civil Union   Occupation:  Material   Patient Pre-hospital Living Situation:  Home  Patient Pre-hospital Level of Mobility:  Independent  Patient Pre-hospital Diet Restrictions:  None  Substance Use History:   Social History     Substance and Sexual Activity   Alcohol Use Yes     Social History     Tobacco Use   Smoking Status Never Smoker   Smokeless Tobacco Never Used     Social History     Substance and Sexual Activity   Drug Use Never       Family History:    History reviewed  No pertinent family history  Physical Exam:     Vitals:   Blood Pressure: 131/75 (01/09/20 0530)  Pulse: 99 (01/09/20 0530)  Temperature: 98 1 °F (36 7 °C) (01/09/20 0041)  Temp Source: Oral (01/09/20 0041)  Respirations: 18 (01/09/20 0530)  Weight - Scale: 105 kg (231 lb 11 3 oz) (01/09/20 0041)  SpO2: 96 % (01/09/20 0530)    Physical Exam   Constitutional: He is oriented to person, place, and time  He appears well-developed and well-nourished  HENT:   Head: Normocephalic and atraumatic     Eyes: Pupils are equal, round, and reactive to light  Conjunctivae and EOM are normal    Neck: Normal range of motion  Neck supple  Cardiovascular: Normal rate, regular rhythm, normal heart sounds and intact distal pulses  Pulmonary/Chest: Effort normal and breath sounds normal    Abdominal: Soft  Bowel sounds are normal  There is tenderness  There is rebound  Tenderness and rebound at McBurney's point   Musculoskeletal: Normal range of motion  Neurological: He is alert and oriented to person, place, and time  Skin: Skin is warm and dry  Capillary refill takes less than 2 seconds  Nursing note and vitals reviewed  Additional Data:     Lab Results: I have personally reviewed pertinent reports  Results from last 7 days   Lab Units 20  0101   WBC Thousand/uL 8 72   HEMOGLOBIN g/dL 14 6   HEMATOCRIT % 44 1   PLATELETS Thousands/uL 167   NEUTROS PCT % 85*   LYMPHS PCT % 9*   MONOS PCT % 6   EOS PCT % 0     Results from last 7 days   Lab Units 20  0101   SODIUM mmol/L 141   POTASSIUM mmol/L 4 2   CHLORIDE mmol/L 104   CO2 mmol/L 31   BUN mg/dL 13   CREATININE mg/dL 1 01   ANION GAP mmol/L 6   CALCIUM mg/dL 8 3   ALBUMIN g/dL 3 8   TOTAL BILIRUBIN mg/dL 0 56   ALK PHOS U/L 68   ALT U/L 26   AST U/L 15   GLUCOSE RANDOM mg/dL 170*     Results from last 7 days   Lab Units 20  0525   INR  1 00             Results from last 7 days   Lab Units 20  0101   LACTIC ACID mmol/L 1 3       Imaging: I have personally reviewed pertinent reports  XR chest 2 views   ED Interpretation by Selam Mario DO (502)   NAD      CT abdomen pelvis wo contrast   ED Interpretation by Selam Mario DO ( 014)   CXR NAD      Final Result by Tad Ceron MD ( 1728)      1  Abnormally dilated appendix with extensive periappendiceal fat stranding and phlegmonous changes in keeping with acute appendicitis    Further evaluation is hampered due to lack of intravenous or oral contrast   No fluid collection to suggest abscess  2   Punctate nonobstructing bilateral renal calculi  I personally discussed this study with Jory Martin on 1/9/2020 at 2:11 AM                   Workstation performed: [id number]             EKG, Pathology, and Other Studies Reviewed on Admission:   · EKG:  Pending    Allscripts / Epic Records Reviewed: Yes     ** Please Note: This note has been constructed using a voice recognition system   **

## 2020-01-09 NOTE — ASSESSMENT & PLAN NOTE
· Right sided abdominal pain times several hours  · CT abdomen pelvis:  Acute appendicitis  · Surgery consult (aware)  · NPO  · IV fluids  · Pain medication  · Zosyn was given in the ER due to Unasyn shortage  · Chest x-ray, EKG, and type and screen pending

## 2020-01-09 NOTE — ANESTHESIA POSTPROCEDURE EVALUATION
Post-Op Assessment Note    CV Status:  Stable  Pain Score: 0    Pain management: adequate     Mental Status:  Alert and awake   Hydration Status:  Stable   PONV Controlled:  None   Airway Patency:  Patent   Post Op Vitals Reviewed: Yes      Staff: CRNA           BP   148/70   Temp   98 7   Pulse  90   Resp   12   SpO2   95

## 2020-01-09 NOTE — CONSULTS
Consultation - General Surgery   Hina Elena 62 y o  male MRN: [id number]  Unit/Bed#: ED 04 Encounter: [id number]    Assessment/Plan     Assessment:  Acute appendicitis  Plan:  I recommend laparoscopic appendectomy possible open appendectomy for this patient  Procedure details were discussed with the patient  Possible complications including bleeding infection blood clot heart attack stroke and conversion open procedure were discussed with him  Possible injury to bowel or bladder were also discussed  Patient's questions were answered and he understands and agrees with the plan  History of Present Illness     HPI:  Hina Elena is a 62 y o  male who presents with chief complaint of sudden onset of right lower quadrant abdominal pain starting last evening at about 7:00 p m  He states that he was leaning over putting on his socks and noted some right lower quadrant abdominal pain  He states that he waited a few hours but the pain did not subside and it got more severe  He presented to the emergency department  States he has had a history of prior kidney stones but this pain was different  He denies any nausea or vomiting  He denies any bowel changes       Consults    Review of Systems    Historical Information   History reviewed  No pertinent past medical history    Past Surgical History:   Procedure Laterality Date    HERNIA REPAIR       Social History   Social History     Substance and Sexual Activity   Alcohol Use Yes     Social History     Substance and Sexual Activity   Drug Use Never     Social History     Tobacco Use   Smoking Status Never Smoker   Smokeless Tobacco Never Used     Family History: non-contributory    Meds/Allergies   all current active meds have been reviewed  No Known Allergies    Objective   First Vitals:   Blood Pressure: 165/97 (01/09/20 0041)  Pulse: (!) 106 (01/09/20 0041)  Temperature: 98 1 °F (36 7 °C) (01/09/20 0041)  Temp Source: Oral (01/09/20 0041)  Respirations: 18 (01/09/20 0041)  Weight - Scale: 105 kg (231 lb 11 3 oz) (01/09/20 0041)  SpO2: 95 % (01/09/20 0041)    Current Vitals:   Blood Pressure: 114/71 (01/09/20 0700)  Pulse: 99 (01/09/20 0700)  Temperature: 98 1 °F (36 7 °C) (01/09/20 0041)  Temp Source: Oral (01/09/20 0041)  Respirations: 15 (01/09/20 0700)  Weight - Scale: 105 kg (231 lb 11 3 oz) (01/09/20 0041)  SpO2: 94 % (01/09/20 0700)      Intake/Output Summary (Last 24 hours) at 1/9/2020 0731  Last data filed at 1/9/2020 0348  Gross per 24 hour   Intake 1100 ml   Output --   Net 1100 ml       Invasive Devices     Peripheral Intravenous Line            Peripheral IV 01/09/20 Right Antecubital less than 1 day                Physical Exam    Lab Results:   I have personally reviewed pertinent lab results  , CBC:   Lab Results   Component Value Date    WBC 8 72 01/09/2020    HGB 14 6 01/09/2020    HCT 44 1 01/09/2020    MCV 92 01/09/2020     01/09/2020    MCH 30 4 01/09/2020    MCHC 33 1 01/09/2020    RDW 12 5 01/09/2020    MPV 10 0 01/09/2020    NRBC 0 01/09/2020   , CMP:   Lab Results   Component Value Date    SODIUM 141 01/09/2020    K 4 2 01/09/2020     01/09/2020    CO2 31 01/09/2020    BUN 13 01/09/2020    CREATININE 1 01 01/09/2020    CALCIUM 8 3 01/09/2020    AST 15 01/09/2020    ALT 26 01/09/2020    ALKPHOS 68 01/09/2020    EGFR 79 01/09/2020     Imaging: I have personally reviewed pertinent reports  and I have personally reviewed pertinent films in PACS  EKG, Pathology, and Other Studies: I have personally reviewed pertinent reports  and I have personally reviewed pertinent films in PACS    Counseling / Coordination of Care  Total floor / unit time spent today 30 minutes  Greater than 50% of total time was spent with the patient and / or family counseling and / or coordination of care  A description of the counseling / coordination of care:  Laparoscopic appendectomy

## 2020-01-09 NOTE — OP NOTE
OPERATIVE REPORT  PATIENT NAME: Brinda Borges    :  1955  MRN: [id number]  Pt Location: [facility] OR ROOM 01    SURGERY DATE: 2020    Surgeon(s) and Role:     * Jai Gonzalez DO - Primary     * Brian Poole PA-C - Assisting     * Francis Gee PA-C - Assisting    Preop Diagnosis:  Appendicitis [K37]    Post-Op Diagnosis Codes:     * Appendicitis [K37]    Procedure(s) (LRB):  APPENDECTOMY LAPAROSCOPIC (N/A)    Specimen(s):  ID Type Source Tests Collected by Time Destination   1 : Appendix Tissue Appendix TISSUE EXAM Hattie Alexandre DO 2020  4:03 PM        Estimated Blood Loss:   5 mL    Drains:  [REMOVED] Urethral Catheter Double-lumen;Non-latex 16 Fr  (Removed)   Number of days: 0       Anesthesia Type:   General    Operative Indications:  Appendicitis [K37]  Acute appendicitis is noted on CT scan and clinical findings  Operative Findings:  Acute suppurative appendicitis is noted  There is no evidence of rupture  No other gross findings are noted with her limited exploration of the  Complications:   None    Procedure and Technique:  Patient identified and brought to the operating room  He was then placed under general endotracheal anesthesia and then a time-out was performed with all members of the team present  A Morris catheter was placed under sterile technique and he is prepped and draped in sterile manner using ChloraPrep  Local anesthetic 0 25% Marcaine with epinephrine was instilled in the umbilical area  A small incision is made at the umbilicus and then the Veress needle inserted into the peritoneal cavity the peritoneum was then insufflated with CO2 and then after sufficient insufflation the Veress needle was removed and a 5 mm trocars placed then under direct vision a 5 mm trocars placed at the suprapubic area and a 12 mm trocars placed in the left lower quadrant  The aforementioned findings were noted    I grasped the appendix at mid aspect it is lying slightly retrocecal  I was able to create a window between the base of the appendix and the mesoappendix  I used a standard load Endo stapler to staple across the base of the appendix 1st secondary to how it was positioned secondary to it lying in front of the mesoappendix I then used an Endo stapler vascular load to staple across the mesoappendix  I used an Endo clip to clip across the small area of bleeding at the staple line of the mesoappendix  The appendix was completely removed from the adjacent tissue and then it was placed in an endobag and removed from the peritoneal cavity via the 12 mm trocar site  I then irrigated with saline and suctioned out the irrigant there was meticulous hemostasis noted  All the organs were suctioned out until clear  The trocars were then all removed and the fascia at the 12 mm trocar site was closed with a figure-of-eight 0 Vicryl suture skin was closed with 4 Vicryl suture and then skin glue was used for dressing  Morris catheter was removed at the end of the procedure the sponge needle instrument counts were correct x2  The patient was extubated transferred to recovery room in satisfactory and stable condition     I was present for the entire procedure and A physician assistant was required during the procedure for retraction tissue handling,dissection and suturing    Patient Disposition:  PACU     SIGNATURE: Nuha Puga DO  DATE: January 9, 2020  TIME: 4:23 PM

## 2020-01-09 NOTE — ASSESSMENT & PLAN NOTE
· Status post appendectomy  · Minimal abdominal pain, Tolerating diet, passes gas freely  · Medically stable for discharge

## 2020-01-09 NOTE — ANESTHESIA PREPROCEDURE EVALUATION
Review of Systems/Medical History  Patient summary reviewed  Chart reviewed  No history of anesthetic complications     Cardiovascular  Negative cardio ROS    Pulmonary  Negative pulmonary ROS        GI/Hepatic  Negative GI/hepatic ROS          Negative  ROS        Endo/Other  Negative endo/other ROS      GYN  Negative gynecology ROS          Hematology  Negative hematology ROS      Musculoskeletal  Negative musculoskeletal ROS Osteoarthritis,        Neurology  Negative neurology ROS      Psychology   Negative psychology ROS          EKG 1/9/20: NSR, normal ECG    CXR 1/9/20: Cardiomediastinal silhouette appears unremarkable  The lungs are clear  No pneumothorax or pleural effusion  Osseous structures appear within normal limits for patient age  CT a/p 1/9/20:  1  Abnormally dilated appendix with extensive periappendiceal fat stranding and phlegmonous changes in keeping with acute appendicitis  Further evaluation is hampered due to lack of intravenous or oral contrast   No fluid collection to suggest abscess  2   Punctate nonobstructing bilateral renal calculi  Physical Exam    Airway  Comment: Small mouth opening  Mallampati score: III  TM Distance: <3 FB  Neck ROM: full     Dental       Cardiovascular  Comment: Negative ROS, Rhythm: regular, Rate: normal, No murmur,     Pulmonary  Breath sounds clear to auscultation,     Other Findings        Anesthesia Plan  ASA Score- 1     Anesthesia Type- general with ASA Monitors  Additional Monitors:   Airway Plan: ETT  Plan Factors-    Induction- intravenous  Postoperative Plan- Plan for postoperative opioid use  Planned trial extubation    Informed Consent- Anesthetic plan and risks discussed with patient  I personally reviewed this patient with the CRNA  Discussed and agreed on the Anesthesia Plan with the JOSEFINA Root

## 2020-01-10 VITALS
WEIGHT: 220 LBS | BODY MASS INDEX: 28.23 KG/M2 | TEMPERATURE: 98 F | HEIGHT: 74 IN | OXYGEN SATURATION: 96 % | DIASTOLIC BLOOD PRESSURE: 74 MMHG | HEART RATE: 94 BPM | SYSTOLIC BLOOD PRESSURE: 130 MMHG | RESPIRATION RATE: 16 BRPM

## 2020-01-10 LAB
ANION GAP SERPL CALCULATED.3IONS-SCNC: 6 MMOL/L (ref 4–13)
ATRIAL RATE: 96 BPM
BASOPHILS # BLD AUTO: 0.02 THOUSANDS/ΜL (ref 0–0.1)
BASOPHILS NFR BLD AUTO: 0 % (ref 0–1)
BUN SERPL-MCNC: 12 MG/DL (ref 5–25)
CALCIUM SERPL-MCNC: 7.6 MG/DL (ref 8.3–10.1)
CHLORIDE SERPL-SCNC: 106 MMOL/L (ref 100–108)
CO2 SERPL-SCNC: 28 MMOL/L (ref 21–32)
CREAT SERPL-MCNC: 0.9 MG/DL (ref 0.6–1.3)
EOSINOPHIL # BLD AUTO: 0.17 THOUSAND/ΜL (ref 0–0.61)
EOSINOPHIL NFR BLD AUTO: 2 % (ref 0–6)
ERYTHROCYTE [DISTWIDTH] IN BLOOD BY AUTOMATED COUNT: 13 % (ref 11.6–15.1)
GFR SERPL CREATININE-BSD FRML MDRD: 91 ML/MIN/1.73SQ M
GLUCOSE SERPL-MCNC: 121 MG/DL (ref 65–140)
HCT VFR BLD AUTO: 39.3 % (ref 36.5–49.3)
HGB BLD-MCNC: 12.6 G/DL (ref 12–17)
IMM GRANULOCYTES # BLD AUTO: 0.04 THOUSAND/UL (ref 0–0.2)
IMM GRANULOCYTES NFR BLD AUTO: 0 % (ref 0–2)
LYMPHOCYTES # BLD AUTO: 1.16 THOUSANDS/ΜL (ref 0.6–4.47)
LYMPHOCYTES NFR BLD AUTO: 12 % (ref 14–44)
MCH RBC QN AUTO: 30.1 PG (ref 26.8–34.3)
MCHC RBC AUTO-ENTMCNC: 32.1 G/DL (ref 31.4–37.4)
MCV RBC AUTO: 94 FL (ref 82–98)
MONOCYTES # BLD AUTO: 0.63 THOUSAND/ΜL (ref 0.17–1.22)
MONOCYTES NFR BLD AUTO: 7 % (ref 4–12)
NEUTROPHILS # BLD AUTO: 7.32 THOUSANDS/ΜL (ref 1.85–7.62)
NEUTS SEG NFR BLD AUTO: 79 % (ref 43–75)
NRBC BLD AUTO-RTO: 0 /100 WBCS
P AXIS: 71 DEGREES
PLATELET # BLD AUTO: 160 THOUSANDS/UL (ref 149–390)
PMV BLD AUTO: 10.1 FL (ref 8.9–12.7)
POTASSIUM SERPL-SCNC: 3.7 MMOL/L (ref 3.5–5.3)
PR INTERVAL: 160 MS
QRS AXIS: 55 DEGREES
QRSD INTERVAL: 104 MS
QT INTERVAL: 352 MS
QTC INTERVAL: 444 MS
RBC # BLD AUTO: 4.19 MILLION/UL (ref 3.88–5.62)
SODIUM SERPL-SCNC: 140 MMOL/L (ref 136–145)
T WAVE AXIS: 29 DEGREES
VENTRICULAR RATE: 96 BPM
WBC # BLD AUTO: 9.34 THOUSAND/UL (ref 4.31–10.16)

## 2020-01-10 PROCEDURE — 99239 HOSP IP/OBS DSCHRG MGMT >30: CPT | Performed by: INTERNAL MEDICINE

## 2020-01-10 PROCEDURE — 85025 COMPLETE CBC W/AUTO DIFF WBC: CPT | Performed by: NURSE PRACTITIONER

## 2020-01-10 PROCEDURE — 93010 ELECTROCARDIOGRAM REPORT: CPT | Performed by: INTERNAL MEDICINE

## 2020-01-10 PROCEDURE — 80048 BASIC METABOLIC PNL TOTAL CA: CPT | Performed by: NURSE PRACTITIONER

## 2020-01-10 RX ADMIN — PIPERACILLIN SODIUM AND TAZOBACTAM SODIUM 3.38 G: 36; 4.5 INJECTION, POWDER, FOR SOLUTION INTRAVENOUS at 03:45

## 2020-01-10 RX ADMIN — PIPERACILLIN SODIUM AND TAZOBACTAM SODIUM 3.38 G: 36; 4.5 INJECTION, POWDER, FOR SOLUTION INTRAVENOUS at 08:24

## 2020-01-10 RX ADMIN — KETOROLAC TROMETHAMINE 15 MG: 30 INJECTION, SOLUTION INTRAMUSCULAR at 05:58

## 2020-01-10 RX ADMIN — KETOROLAC TROMETHAMINE 15 MG: 30 INJECTION, SOLUTION INTRAMUSCULAR at 11:55

## 2020-01-10 RX ADMIN — SODIUM CHLORIDE 100 ML/HR: 0.9 INJECTION, SOLUTION INTRAVENOUS at 06:35

## 2020-01-10 RX ADMIN — HEPARIN SODIUM 5000 UNITS: 5000 INJECTION INTRAVENOUS; SUBCUTANEOUS at 05:58

## 2020-01-10 NOTE — PROGRESS NOTES
Postop day 1  Patient is seen status post appendectomy  He states he is doing well he has no nausea or vomiting  He tolerated a general diet last night without difficulty  He has urinated without difficulty  Afebrile    Abdomen soft nontender wound is intact with no erythema drainage or fluctuance  Assessment:  Status post appendectomy postop day 1  Plan:  The patient may be discharged later today  No outpatient medications needed  Patient will take over-the-counter pain meds p r n  Edilia Pascale I will see him in the office in approximately 2 weeks for recheck  He is to call the office for time for appointment

## 2020-01-10 NOTE — PLAN OF CARE
Problem: PAIN - ADULT  Goal: Verbalizes/displays adequate comfort level or baseline comfort level  Description  Interventions:  - Encourage patient to monitor pain and request assistance  - Assess pain using appropriate pain scale  - Administer analgesics based on type and severity of pain and evaluate response  - Implement non-pharmacological measures as appropriate and evaluate response  - Consider cultural and social influences on pain and pain management  - Notify physician/advanced practitioner if interventions unsuccessful or patient reports new pain  Outcome: Progressing     Problem: INFECTION - ADULT  Goal: Absence or prevention of progression during hospitalization  Description  INTERVENTIONS:  - Assess and monitor for signs and symptoms of infection  - Monitor lab/diagnostic results  - Monitor all insertion sites, i e  indwelling lines, tubes, and drains  - Monitor endotracheal if appropriate and nasal secretions for changes in amount and color  - [place] appropriate cooling/warming therapies per order  - Administer medications as ordered  - Instruct and encourage patient and family to use good hand hygiene technique  - Identify and instruct in appropriate isolation precautions for identified infection/condition  Outcome: Progressing  Goal: Absence of fever/infection during neutropenic period  Description  INTERVENTIONS:  - Monitor WBC    Outcome: Progressing     Problem: SAFETY ADULT  Goal: Patient will remain free of falls  Description  INTERVENTIONS:  - Assess patient frequently for physical needs  -  Identify cognitive and physical deficits and behaviors that affect risk of falls    -  [place] fall precautions as indicated by assessment   - Educate patient/family on patient safety including physical limitations  - Instruct patient to call for assistance with activity based on assessment  - Modify environment to reduce risk of injury  - Consider OT/PT consult to assist with strengthening/mobility  Outcome: Progressing  Goal: Maintain or return to baseline ADL function  Description  INTERVENTIONS:  -  Assess patient's ability to carry out ADLs; assess patient's baseline for ADL function and identify physical deficits which impact ability to perform ADLs (bathing, care of mouth/teeth, toileting, grooming, dressing, etc )  - Assess/evaluate cause of self-care deficits   - Assess range of motion  - Assess patient's mobility; develop plan if impaired  - Assess patient's need for assistive devices and provide as appropriate  - Encourage maximum independence but intervene and supervise when necessary  - Involve family in performance of ADLs  - Assess for home care needs following discharge   - Consider OT consult to assist with ADL evaluation and planning for discharge  - Provide patient education as appropriate  Outcome: Progressing  Goal: Maintain or return mobility status to optimal level  Description  INTERVENTIONS:  - Assess patient's baseline mobility status (ambulation, transfers, stairs, etc )    - Identify cognitive and physical deficits and behaviors that affect mobility  - Identify mobility aids required to assist with transfers and/or ambulation (gait belt, sit-to-stand, lift, walker, cane, etc )  - [place] fall precautions as indicated by assessment  - Record patient progress and toleration of activity level on Mobility SBAR; progress patient to next Phase/Stage  - Instruct patient to call for assistance with activity based on assessment  - Consider rehabilitation consult to assist with strengthening/weightbearing, etc   Outcome: Progressing     Problem: Knowledge Deficit  Goal: Patient/family/caregiver demonstrates understanding of disease process, treatment plan, medications, and discharge instructions  Description  Complete learning assessment and assess knowledge base    Interventions:  - Provide teaching at level of understanding  - Provide teaching via preferred learning methods  Outcome: Progressing

## 2020-01-10 NOTE — DISCHARGE SUMMARY
Discharge- Leland Primrose 1955, 62 y o  male MRN: [id number]    Unit/Bed#: -01 Encounter: [id number]    Primary Care Provider: Rohit Dejesus DO   Date and time admitted to hospital: 1/9/2020 12:39 AM      Acute appendicitis  Assessment & Plan  · Status post appendectomy  · Minimal abdominal pain, Tolerating diet, passes gas freely  · Medically stable for discharge      Discharging Physician / Practitioner: Antoni Rodriguez MD  PCP: Rohit Dejesus DO  Admission Date:   Admission Orders (From admission, onward)     Ordered        01/09/20 0254  Inpatient Admission (expected length of stay for this patient Order details is greater than two midnights)  Once                   Discharge Date: 01/10/20    Disposition:      Other: Home    For Discharges to [facility] SNF:   · Not Applicable to this Patient - Not Applicable to this Patient    Reason for Admission:  Appendicitis    Discharge Diagnoses:     Please see assessment and plan section above for further details regarding discharge diagnoses  Resolved Problems  Date Reviewed: 1/9/2020    None          Consultations During Hospital Stay:  Yves Moe TO ACUTE CARE SURGERY     Procedures Performed:   Procedure(s) (LRB):  APPENDECTOMY LAPAROSCOPIC (N/A)      Ct Abdomen Pelvis Wo Contrast    Result Date: 1/9/2020  Narrative: CT ABDOMEN AND PELVIS WITHOUT IV CONTRAST INDICATION:   RUQ pain  COMPARISON:  10/17/2011  TECHNIQUE:  CT examination of the abdomen and pelvis was performed without intravenous contrast   Axial, sagittal, and coronal 2D reformatted images were created from the source data and submitted for interpretation  Radiation dose length product (DLP) for this visit:  603 mGy-cm   This examination, like all CT scans performed in the [facility], was performed utilizing techniques to minimize radiation dose exposure, including the use of iterative reconstruction and automated exposure control   Enteric contrast was not administered  FINDINGS: ABDOMEN LOWER CHEST:  Atelectatic changes are noted at the lung bases  LIVER/BILIARY TREE:  Unremarkable  GALLBLADDER:  No calcified gallstones  No pericholecystic inflammatory change  SPLEEN:  Unremarkable  PANCREAS:  Unremarkable  ADRENAL GLANDS:  Unremarkable  KIDNEYS/URETERS:  Punctate nonobstructing bilateral renal calculi  The largest measures 4 mm in the lower pole of the left kidney  2 8 cm cyst with peripheral calcification in the interpolar left kidney  Additional smaller bilateral renal cysts are present  No hydronephrosis  STOMACH AND BOWEL:  Unremarkable  APPENDIX:  Limited evaluation without enteric or intravenous contrast   Abnormally dilated appendix measuring 14 mm in maximal axial diameter  Extensive periappendiceal fat stranding and phlegmonous changes extending to the tip of the right hepatic lobe  No organized fluid collection to suggest abscess  ABDOMINOPELVIC CAVITY:  No ascites or free intraperitoneal air  No lymphadenopathy  VESSELS:  Unremarkable for patient's age  PELVIS REPRODUCTIVE ORGANS:  Unremarkable for patient's age  URINARY BLADDER:  Unremarkable  ABDOMINAL WALL/INGUINAL REGIONS:  Evidence of prior inguinal hernia repair  Multiple venous collaterals in the right groin  OSSEOUS STRUCTURES:  No acute fracture or destructive osseous lesion  Grade 1 anterolisthesis of L4 on L5  Impression: 1  Abnormally dilated appendix with extensive periappendiceal fat stranding and phlegmonous changes in keeping with acute appendicitis  Further evaluation is hampered due to lack of intravenous or oral contrast   No fluid collection to suggest abscess  2   Punctate nonobstructing bilateral renal calculi  I personally discussed this study with Norberto Kim on 1/9/2020 at 2:11 AM  Workstation performed: [id number]     Xr Chest 2 Views    Result Date: 1/9/2020  Narrative: CHEST INDICATION:   RUQ pain   COMPARISON:  8/5/2014 EXAM PERFORMED/VIEWS:  XR CHEST PA & LATERAL FINDINGS: Cardiomediastinal silhouette appears unremarkable  The lungs are clear  No pneumothorax or pleural effusion  Osseous structures appear within normal limits for patient age  Impression: No acute cardiopulmonary disease  Workstation performed: [id number]        Medication Adjustments and Discharge Medications:  · Summary of Medication Adjustments made as a result of this hospitalization:  None  · Medication Dosing Tapers - Please refer to Discharge Medication List for details on any medication dosing tapers (if applicable to patient)  · Discharge Medication List: See after visit summary for reconciled discharge medications  Wound Care Recommendations:  When applicable, please see wound care section of After Visit Summary  Diet Recommendations at Discharge:  Diet -        Diet Orders   (From admission, onward)             Start     Ordered    01/10/20 0844  Diet Regular; Regular House  Diet effective now     Question Answer Comment   Diet Type Regular    Regular Regular House    RD to adjust diet per protocol? Yes        01/10/20 0843                  Incidental Findings:   · None     Test Results Pending at Discharge (will require follow up): · Non         Hospital Course:     Sammie Wheat is a 62 y o  male patient who originally presented to the hospital on 1/9/2020 due to appendicitis  Appendectomy done  Postop course was smooth  On day of discharge patient had no pain, tolerated diet, passes gas freely      Condition at Discharge: stable     Discharge Day Visit / Exam:     Subjective:  No complaints  Vitals: Blood Pressure: 130/74 (01/10/20 1041)  Pulse: 94 (01/10/20 1041)  Temperature: 98 °F (36 7 °C) (01/10/20 1041)  Temp Source: Oral (01/09/20 1308)  Respirations: 16 (01/10/20 1041)  Height: 6' 2" (188 cm) (01/09/20 1308)  Weight - Scale: 99 8 kg (220 lb) (01/09/20 1308)  SpO2: 96 % (01/10/20 1041)  Exam:     General appearance: alert, appears stated age and cooperative  Head: Normocephalic, without obvious abnormality, atraumatic  Lungs: clear to auscultation bilaterally  Heart: regular rate and rhythm  Abdomen: soft, non-tender, positive bowel sounds   Back: negative  Extremities: extremities atraumatic, no cyanosis or edema  Neurologic: Alert and oriented X 3, normal strength and tone  Normal symmetric reflexes  Normal coordination and gait      Discharge instructions/Information to patient and family:   See after visit summary section titled Discharge Instructions for information provided to patient and family  Planned Readmission:  No      Discharge Statement:  I spent 35 minutes discharging the patient  This time was spent on the day of discharge  I had direct contact with the patient on the day of discharge  Greater than 50% of the total time was spent examining patient, answering all patient questions, arranging and discussing plan of care with patient as well as directly providing post-discharge instructions  Additional time then spent on discharge activities      ** Please Note: This note has been constructed using a voice recognition system **

## 2020-01-10 NOTE — UTILIZATION REVIEW
Notification of Inpatient Admission/Inpatient Authorization Request   This is a Notification of Inpatient Admission for [address]  Be advised that this patient was admitted to our facility under Inpatient Status  Contact Rocio Lambert at [phone] for additional admission information  [facility] DEPT  DEDICATED [phone]  Patient Name:   Karen Stein   YOB: 1955       [address]   [address]:   [address]  Tax ID: [id number]  NPI: [id number] Attending Provider/NPI: Grace Howard [[id number]]   [address]  Phone 1: [phone]  Phone 2:   Fax: [phone]   Place of Service Code: 24     Place of Service Name:  [address] [place] [address]   Start Date: 1/9/20 0254     Discharge Date & Time: No discharge date for patient encounter  Type of Admission: Inpatient Status Discharge Disposition (if discharged): Final discharge disposition not confirmed   Patient Diagnoses: Appendicitis [K37]  Side pain [R10 9]     Orders: Admission Orders (From admission, onward)     Ordered        01/09/20 0254  Inpatient Admission (expected length of stay for this patient Order details is greater than two midnights)  Once                    Assigned Utilization Review Contact: Rocio Lambert  Utilization   Network Utilization Review Department  Phone: [phone]; Fax [phone]  Email: [email or web address]   ATTENTION PAYERS: Please call the assigned Utilization  directly with any questions or concerns ALL voicemails in the department are confidential  Send all requests for admission clinical reviews, approved or denied determinations and any other requests to dedicated fax number belonging to the campus where the patient is receiving treatment

## 2020-01-10 NOTE — NURSING NOTE
Reviewed d/c instructions with pt with no questions or concerns offered  IV line pulled    Pt left with spouse independently with all belongings

## 2020-01-10 NOTE — UTILIZATION REVIEW
Initial Clinical Review    Admission: Date/Time/Statement: Inpatient Admission Orders (From admission, onward)     Ordered        01/09/20 0254  Inpatient Admission (expected length of stay for this patient Order details is greater than two midnights)  Once                   Orders Placed This Encounter   Procedures    Inpatient Admission (expected length of stay for this patient Order details is greater than two midnights)     Standing Status:   Standing     Number of Occurrences:   1     Order Specific Question:   Admitting Physician     Answer:   Lena Joyner [[id number]]     Order Specific Question:   Level of Care     Answer:   Med Surg [16]     Order Specific Question:   Estimated length of stay     Answer:   More than 2 Midnights     Order Specific Question:   Certification     Answer:   I certify that inpatient services are medically necessary for this patient for a duration of greater than two midnights  See H&P and MD Progress Notes for additional information about the patient's course of treatment  ED Arrival Information     Expected Arrival Acuity Means of Arrival Escorted By Service Admission Type    - 1/9/2020 00:27 Urgent Walk-In Spouse Hospitalist Urgent    Arrival Complaint    r side pain         Chief Complaint   Patient presents with    Abdominal Pain     Patient has right sided abdominal pain for the last several hours  Patient denies NVD at this time  Assessment/Plan: 62year old male tot he ED from home with complaints of right sided abdominal pain for about 4 hours prior to his arrival   Admitted to inpatient for appendicitis  He arrives with right sided abdominal tenderness, tachycardia  IV fluids and IV abx started in the ED  CT shows acute appendicitis  NPO, surgery consult  Surgery consult 1/9:  Recommend laparoscopic appendectomy possible open     OPERATIVE REPORT:  SURGERY DATE: 1/9/2020  Procedure(s) (LRB):  APPENDECTOMY LAPAROSCOPIC (N/A)  Operative Findings:  Acute suppurative appendicitis is noted  There is no evidence of rupture  No other gross findings are noted  ED Triage Vitals [01/09/20 0041]   Temperature Pulse Respirations Blood Pressure SpO2   98 1 °F (36 7 °C) (!) 106 18 165/97 95 %      Temp Source Heart Rate Source Patient Position - Orthostatic VS BP Location FiO2 (%)   Oral Monitor Lying Left arm --      Pain Score       8        Wt Readings from Last 1 Encounters:   01/09/20 99 8 kg (220 lb)     Additional Vital Signs:   Date/Time Temp Pulse Resp BP MAP (mmHg) SpO2   01/10/20 07:10:09 98 8 °F (37 1 °C) 89 16 121/74 90 95 %   01/10/20 02:47:41 99 2 °F (37 3 °C) 98 16 126/75 92 90 %   01/10/20 01:47:24 99 2 °F (37 3 °C) 102 16 113/69 84 92 %   01/10/20 00:44:21 99 6 °F (37 6 °C) 98 16 118/69 85 92 %   01/09/20 23:42:52 99 3 °F (37 4 °C) 102 16 148/90 109 95 %   01/09/20 22:36:47 99 7 °F (37 6 °C) 96 16 150/91 111 95 %   01/09/20 22:05:19 99 5 °F (37 5 °C) 100 21 155/91 112 96 %   01/09/20 1645 -- 87 18 139/64 -- 93 %   01/09/20 1635 98 7 °F (37 1 °C) 90 12 148/70 -- 92 %   01/09/20 1308 98 9 °F (37 2 °C) 98 14 138/79 -- 93 %   01/09/20 0530 -- 99 18 131/75 -- 96 %   01/09/20 0230 -- 94 18 119/70 -- 94 %   01/09/20 0041 98 1 °F (36 7 °C)            Pertinent Labs/Diagnostic Test Results:   CXR 1/9:  No acute cardiopulmonary disease  CT a/P 1/9: Abnormally dilated appendix with extensive periappendiceal fat stranding and phlegmonous changes in keeping with acute appendicitis   Further evaluation is hampered due to lack of intravenous or oral contrast   No fluid collection to suggest abscess    Punctate nonobstructing bilateral renal calculi    Results from last 7 days   Lab Units 01/10/20  0440 01/09/20  1741 01/09/20  0101   WBC Thousand/uL 9 34  --  8 72   HEMOGLOBIN g/dL 12 6  --  14 6   HEMATOCRIT % 39 3  --  44 1   PLATELETS Thousands/uL 160 169 167   NEUTROS ABS Thousands/µL 7 32  --  7 34         Results from last 7 days   Lab Units 01/10/20  0440 01/09/20  0101   SODIUM mmol/L 140 141   POTASSIUM mmol/L 3 7 4 2   CHLORIDE mmol/L 106 104   CO2 mmol/L 28 31   ANION GAP mmol/L 6 6   BUN mg/dL 12 13   CREATININE mg/dL 0 90 1 01   EGFR ml/min/1 73sq m 91 79   CALCIUM mg/dL 7 6* 8 3     Results from last 7 days   Lab Units 01/09/20  0101   AST U/L 15   ALT U/L 26   ALK PHOS U/L 68   TOTAL PROTEIN g/dL 7 5   ALBUMIN g/dL 3 8   TOTAL BILIRUBIN mg/dL 0 56     Results from last 7 days   Lab Units 01/09/20  2058   POC GLUCOSE mg/dl 197*     Results from last 7 days   Lab Units 01/10/20  0440 01/09/20  0101   GLUCOSE RANDOM mg/dL 121 170*     Results from last 7 days   Lab Units 01/09/20  0525   PROTIME seconds 13 2   INR  1 00   PTT seconds 24     Results from last 7 days   Lab Units 01/09/20  0101   LACTIC ACID mmol/L 1 3       Results from last 7 days   Lab Units 01/09/20  0101   LIPASE u/L 53*     Results from last 7 days   Lab Units 01/09/20  0722   CLARITY UA  Clear   COLOR UA  Yellow   SPEC GRAV UA  1 020   PH UA  7 0   GLUCOSE UA mg/dl Negative   KETONES UA mg/dl Negative   BLOOD UA  Negative   PROTEIN UA mg/dl Negative   NITRITE UA  Negative   BILIRUBIN UA  Negative   UROBILINOGEN UA E U /dl 0 2   LEUKOCYTES UA  Negative         ED Treatment:   Medication Administration from 01/09/2020 0023 to 01/09/2020 1301       Date/Time Order Dose Route Action     01/09/2020 0102 ondansetron (ZOFRAN) injection 4 mg 4 mg Intravenous Given     01/09/2020 0104 sodium chloride 0 9 % bolus 1,000 mL 1,000 mL Intravenous New Bag     01/09/2020 0105 HYDROmorphone (DILAUDID) injection 1 mg 1 mg Intravenous Given     01/09/2020 0103 ketorolac (TORADOL) injection 15 mg 15 mg Intravenous Given     01/09/2020 0241 ampicillin-sulbactam (UNASYN) 3 g in sodium chloride 0 9 % 100 mL IVPB   Intravenous Canceled Entry     01/09/2020 0304 piperacillin-tazobactam (ZOSYN) 4 5 g in sodium chloride 0 9 % 100 mL IVPB   Intravenous Canceled Entry     01/09/2020 0348 ampicillin-sulbactam (UNASYN) 3 g in sodium chloride 0 9 % 100 mL IVPB 0 g Intravenous Stopped     01/09/2020 0318 ampicillin-sulbactam (UNASYN) 3 g in sodium chloride 0 9 % 100 mL IVPB 3 g Intravenous New Bag     01/09/2020 0733 sodium chloride 0 9 % infusion 100 mL/hr Intravenous New Bag     01/09/2020 1301 ondansetron (ZOFRAN) injection 4 mg   Intravenous MAR Hold     01/09/2020 1301 HYDROmorphone (DILAUDID) injection 0 5 mg   Intravenous MAR Hold     01/09/2020 1301 ketorolac (TORADOL) injection 15 mg   Intravenous MAR Hold     01/09/2020 1301 piperacillin-tazobactam (ZOSYN) 3 375 g in sodium chloride 0 9 % 50 mL IVPB   Intravenous MAR Hold     01/09/2020 0822 piperacillin-tazobactam (ZOSYN) 3 375 g in sodium chloride 0 9 % 50 mL IVPB 3 375 g Intravenous New Bag        Admitting Diagnosis: Appendicitis [K37]  Side pain [R10 9]  Age/Sex: 62 y o  male      Scheduled Medications:    Medications:  heparin (porcine) 5,000 Units Subcutaneous Q8H [address]   ketorolac 15 mg Intravenous Q6H [address]   piperacillin-tazobactam 3 375 g Intravenous Q6H     Continuous IV Infusions:    sodium chloride 100 mL/hr Intravenous Continuous     PRN Meds:    ondansetron 4 mg Intravenous Q6H PRN   oxyCODONE-acetaminophen 1 tablet Oral Q4H PRN       IP CONSULT TO ACUTE CARE SURGERY    Network Utilization Review Department  [email or web address]  ATTENTION: Please call with any questions or concerns to [phone] and carefully listen to the prompts so that you are directed to the right person  All voicemails are confidential   Madonna Go all requests for admission clinical reviews, approved or denied determinations and any other requests to dedicated fax number below belonging to the campus where the patient is receiving treatment   List of dedicated fax numbers for the Facilities:  [address] [place] [address] DENIALS (Administrative/Medical Necessity) [phone]   [address] (Maternity/NICU/Pediatrics) [phone]   [facility] [place] [phone]   Scot Ritter [phone]   [facility] [phone]   [address] [phone]   [facility]  [phone]   [address] [facility] [address] [facility]  [address] [address] [address] [facility] [phone]

## 2020-01-11 NOTE — UTILIZATION REVIEW
Notification of Discharge  This is a Notification of Discharge from our facility [address]  Please be advised that this patient has been discharge from our facility  Below you will find the admission and discharge date and time including the patients disposition  PRESENTATION DATE: 1/9/2020 12:39 AM  OBS ADMISSION DATE:   IP ADMISSION DATE: 1/9/20 0254   DISCHARGE DATE: 1/10/2020  2:58 PM  DISPOSITION: Home/Self Care Home/Self Care   Admission Orders listed below:  Admission Orders (From admission, onward)     Ordered        01/09/20 0254  Inpatient Admission (expected length of stay for this patient Order details is greater than two midnights)  Once                   Please contact the UR Department if additional information is required to close this patient's authorization/case  [address] Utilization Review Department  Main: [phone] x carefully listen to the prompts  All voicemails are confidential   [email or web address]  Send all requests for admission clinical reviews, approved or denied determinations and any other requests to dedicated fax number below belonging to the campus where the patient is receiving treatment   List of dedicated fax numbers:  [address] DENIALS (Administrative/Medical Necessity) [phone]   [address]  (Maternity/NICU/Pediatrics) [phone]   Cesario Russell [phone]   Maru Roldan [phone]   [facility] [phone]   [facility] [place] [address] [phone]   [facility]  [phone]   [address] [place] [address], [address]  [address] [facility] [address] [facility] [phone]

## 2020-07-14 ENCOUNTER — OFFICE VISIT (OUTPATIENT)
Dept: OBGYN CLINIC | Facility: CLINIC | Age: 65
End: 2020-07-14
Payer: COMMERCIAL

## 2020-07-14 ENCOUNTER — APPOINTMENT (OUTPATIENT)
Dept: RADIOLOGY | Facility: MEDICAL CENTER | Age: 65
End: 2020-07-14
Payer: COMMERCIAL

## 2020-07-14 VITALS
WEIGHT: 226.5 LBS | TEMPERATURE: 98.9 F | HEIGHT: 74 IN | HEART RATE: 108 BPM | SYSTOLIC BLOOD PRESSURE: 147 MMHG | DIASTOLIC BLOOD PRESSURE: 95 MMHG | BODY MASS INDEX: 29.07 KG/M2

## 2020-07-14 DIAGNOSIS — S83.232A COMPLEX TEAR OF MEDIAL MENISCUS OF LEFT KNEE AS CURRENT INJURY, INITIAL ENCOUNTER: Primary | ICD-10-CM

## 2020-07-14 DIAGNOSIS — M25.562 ACUTE PAIN OF LEFT KNEE: ICD-10-CM

## 2020-07-14 PROCEDURE — 20610 DRAIN/INJ JOINT/BURSA W/O US: CPT | Performed by: ORTHOPAEDIC SURGERY

## 2020-07-14 PROCEDURE — 73562 X-RAY EXAM OF KNEE 3: CPT

## 2020-07-14 PROCEDURE — 99213 OFFICE O/P EST LOW 20 MIN: CPT | Performed by: ORTHOPAEDIC SURGERY

## 2020-07-14 RX ORDER — TRIAMCINOLONE ACETONIDE 40 MG/ML
80 INJECTION, SUSPENSION INTRA-ARTICULAR; INTRAMUSCULAR
Status: COMPLETED | OUTPATIENT
Start: 2020-07-14 | End: 2020-07-14

## 2020-07-14 RX ORDER — LIDOCAINE HYDROCHLORIDE 10 MG/ML
4 INJECTION, SOLUTION INFILTRATION; PERINEURAL
Status: COMPLETED | OUTPATIENT
Start: 2020-07-14 | End: 2020-07-14

## 2020-07-14 RX ADMIN — LIDOCAINE HYDROCHLORIDE 4 ML: 10 INJECTION, SOLUTION INFILTRATION; PERINEURAL at 16:34

## 2020-07-14 RX ADMIN — TRIAMCINOLONE ACETONIDE 80 MG: 40 INJECTION, SUSPENSION INTRA-ARTICULAR; INTRAMUSCULAR at 16:34

## 2020-07-14 NOTE — PROGRESS NOTES
Assessment/Plan:  Assessment/Plan   Diagnoses and all orders for this visit:    Acute pain of left knee  -     XR knee 3 vw left non injury; Future        Subjective:   Patient ID: Connie Hawthorne is a 62 y o  male  HPI  Patient presents today with new complaint of a right medial knee pain with weight-bearing  He reports that he has had insidious onset pain of approximately 1 month, and denies any specific incident of injury  His pain is sharp with weight-bearing or twisting motions  He complains of swelling, irritation with deep knee flexion, and occasional nonpainful popping  He denies any bruising, numbness, tingling, or feelings of instability  He has been taking Advil/Tylenol as needed for pain and soreness, which he says has helped him control his symptoms that is not completely relieved his pain  The following portions of the patient's history were reviewed and updated as appropriate: allergies, current medications, past family history, past medical history, past social history, past surgical history and problem list     Patient Active Problem List   Diagnosis    Acute appendicitis    Appendicitis     No past medical history on file  Past Surgical History:   Procedure Laterality Date    HERNIA REPAIR      KNEE SURGERY Right 2012    meniscus and bone chip fx repaired    [place] LAP,APPENDECTOMY N/A 1/9/2020    Procedure: APPENDECTOMY LAPAROSCOPIC;  Surgeon: Steven Haynes DO;  Location:  MAIN OR;  Service: General    VARICOSE VEIN SURGERY Bilateral      No family history on file      Social History     Socioeconomic History    Marital status: /Civil Union     Spouse name: None    Number of children: None    Years of education: None    Highest education level: None   Occupational History    None   Social Needs    Financial resource strain: None    Food insecurity:     Worry: None     Inability: None    Transportation needs:     Medical: None     Non-medical: None   Tobacco Use    Smoking status: Never Smoker    Smokeless tobacco: Never Used   Substance and Sexual Activity    Alcohol use: Yes     Frequency: Monthly or less    Drug use: Never    Sexual activity: None   Lifestyle    Physical activity:     Days per week: None     Minutes per session: None    Stress: None   Relationships    Social connections:     Talks on phone: None     Gets together: None     Attends Holiness service: None     Active member of club or organization: None     Attends meetings of clubs or organizations: None     Relationship status: None    Intimate partner violence:     Fear of current or ex partner: None     Emotionally abused: None     Physically abused: None     Forced sexual activity: None   Other Topics Concern    None   Social History Narrative    None       Current Outpatient Medications:     Multiple Vitamin (MULTIVITAMIN) tablet, Take 1 tablet by mouth daily, Disp: , Rfl:     No Known Allergies    Review of Systems   Constitutional: Negative for chills, fever and unexpected weight change  HENT: Negative for hearing loss, nosebleeds and sore throat  Eyes: Negative for pain, redness and visual disturbance  Respiratory: Negative for cough, shortness of breath and wheezing  Cardiovascular: Negative for chest pain, palpitations and leg swelling  Gastrointestinal: Negative for abdominal pain, nausea and vomiting  Endocrine: Negative for polydipsia and polyuria  Genitourinary: Negative for dysuria and hematuria  Musculoskeletal:        As noted in HPI   Skin: Negative for rash and wound  Neurological: Negative for dizziness, numbness and headaches  Psychiatric/Behavioral: Negative for decreased concentration and suicidal ideas  The patient is not nervous/anxious          Objective:  /95 (BP Location: Right arm, Patient Position: Sitting, Cuff Size: Large)   Pulse (!) 108   Temp 98 9 °F (37 2 °C)   Ht 6' 2" (1 88 m)   Wt 103 kg (226 lb 8 oz)   BMI 29 08 kg/m² Ortho Exam   Left knee -   No obvious anatomical deformity  Effusion noted on exam  TTP over medial femoral condyle  TTP over medial joint line  ROM 0°-115°  Knee is stable to varus and valgus stress  Knee is stable to anterior and posterior stress  Equivocal Aaron's  Calf compartments are soft  Neurovascularly intact distally    Physical Exam    Attending Physician has personally reviewed pertinent imaging and/or reports in PACS, impression is as follows:     Review of radiographic series taken 7/14/2020 of the left knee early DJD medial compartment  Large joint arthrocentesis: L knee  Date/Time: 7/14/2020 4:34 PM  Consent given by: patient  Site marked: site marked  Timeout: Immediately prior to procedure a time out was called to verify the correct patient, procedure, equipment, support staff and site/side marked as required   Supporting Documentation  Indications: pain and joint swelling   Procedure Details  Location: knee - L knee  Preparation: Patient was prepped and draped in the usual sterile fashion  Needle size: 22 G  Ultrasound guidance: no  Approach: anterolateral  Medications administered: 4 mL lidocaine 1 %; 80 mg triamcinolone acetonide 40 mg/mL    Aspirate amount: 60 mL  Aspirate: clear and blood-tinged    Patient tolerance: patient tolerated the procedure well with no immediate complications  Dressing:  Sterile dressing applied        Scribe Attestation    I,:   Camille Garcia am acting as a scribe while in the presence of the attending physician :        I,:   Javier Davis MD personally performed the services described in this documentation    as scribed in my presence :

## 2020-09-30 ENCOUNTER — TRANSCRIBE ORDERS (OUTPATIENT)
Dept: ADMINISTRATIVE | Facility: HOSPITAL | Age: 65
End: 2020-09-30

## 2020-09-30 DIAGNOSIS — M79.604 RIGHT LEG PAIN: Primary | ICD-10-CM

## 2020-10-02 ENCOUNTER — HOSPITAL ENCOUNTER (OUTPATIENT)
Dept: NON INVASIVE DIAGNOSTICS | Facility: HOSPITAL | Age: 65
Discharge: HOME | End: 2020-10-02
Payer: COMMERCIAL

## 2020-10-02 DIAGNOSIS — M79.604 RIGHT LEG PAIN: ICD-10-CM

## 2020-10-02 PROCEDURE — 93971 EXTREMITY STUDY: CPT

## 2020-10-02 PROCEDURE — 93971 EXTREMITY STUDY: CPT | Performed by: SURGERY

## 2021-09-20 ENCOUNTER — HOSPITAL ENCOUNTER (INPATIENT)
Facility: HOSPITAL | Age: 66
LOS: 2 days | Discharge: HOME | DRG: 871 | End: 2021-09-22
Attending: EMERGENCY MEDICINE | Admitting: FAMILY MEDICINE
Payer: COMMERCIAL

## 2021-09-20 ENCOUNTER — APPOINTMENT (EMERGENCY)
Dept: RADIOLOGY | Facility: HOSPITAL | Age: 66
DRG: 871 | End: 2021-09-20
Payer: COMMERCIAL

## 2021-09-20 DIAGNOSIS — U07.1 PNEUMONIA DUE TO COVID-19 VIRUS: Primary | ICD-10-CM

## 2021-09-20 DIAGNOSIS — J96.01 ACUTE RESPIRATORY FAILURE WITH HYPOXIA (HCC): ICD-10-CM

## 2021-09-20 DIAGNOSIS — J12.82 PNEUMONIA DUE TO COVID-19 VIRUS: Primary | ICD-10-CM

## 2021-09-20 DIAGNOSIS — J96.91 RESPIRATORY FAILURE WITH HYPOXIA (HCC): ICD-10-CM

## 2021-09-20 DIAGNOSIS — N30.00 ACUTE CYSTITIS WITHOUT HEMATURIA: ICD-10-CM

## 2021-09-20 DIAGNOSIS — R50.9 FEVER: ICD-10-CM

## 2021-09-20 PROBLEM — E87.1 HYPONATREMIA: Status: ACTIVE | Noted: 2021-09-20

## 2021-09-20 LAB
ALBUMIN SERPL BCP-MCNC: 2.9 G/DL (ref 3.5–5)
ALP SERPL-CCNC: 64 U/L (ref 46–116)
ALT SERPL W P-5'-P-CCNC: 43 U/L (ref 12–78)
ANION GAP SERPL CALCULATED.3IONS-SCNC: 10 MMOL/L (ref 4–13)
AST SERPL W P-5'-P-CCNC: 66 U/L (ref 5–45)
BACTERIA UR QL AUTO: ABNORMAL /HPF
BASOPHILS # BLD MANUAL: 0 THOUSAND/UL (ref 0–0.1)
BASOPHILS NFR MAR MANUAL: 0 % (ref 0–1)
BILIRUB SERPL-MCNC: 0.71 MG/DL (ref 0.2–1)
BILIRUB UR QL STRIP: ABNORMAL
BUN SERPL-MCNC: 13 MG/DL (ref 5–25)
CALCIUM ALBUM COR SERPL-MCNC: 9.2 MG/DL (ref 8.3–10.1)
CALCIUM SERPL-MCNC: 8.3 MG/DL (ref 8.3–10.1)
CHLORIDE SERPL-SCNC: 96 MMOL/L (ref 100–108)
CLARITY UR: ABNORMAL
CO2 SERPL-SCNC: 25 MMOL/L (ref 21–32)
COLOR UR: ABNORMAL
CREAT SERPL-MCNC: 0.97 MG/DL (ref 0.6–1.3)
EOSINOPHIL # BLD MANUAL: 0 THOUSAND/UL (ref 0–0.4)
EOSINOPHIL NFR BLD MANUAL: 0 % (ref 0–6)
ERYTHROCYTE [DISTWIDTH] IN BLOOD BY AUTOMATED COUNT: 13 % (ref 11.6–15.1)
GFR SERPL CREATININE-BSD FRML MDRD: 82 ML/MIN/1.73SQ M
GIANT PLATELETS BLD QL SMEAR: PRESENT
GLUCOSE SERPL-MCNC: 113 MG/DL (ref 65–140)
GLUCOSE UR STRIP-MCNC: NEGATIVE MG/DL
HCT VFR BLD AUTO: 44.9 % (ref 36.5–49.3)
HGB BLD-MCNC: 15.5 G/DL (ref 12–17)
HGB UR QL STRIP.AUTO: ABNORMAL
HYALINE CASTS #/AREA URNS LPF: ABNORMAL /LPF
KETONES UR STRIP-MCNC: ABNORMAL MG/DL
LACTATE SERPL-SCNC: 1.5 MMOL/L (ref 0.5–2)
LEUKOCYTE ESTERASE UR QL STRIP: NEGATIVE
LG PLATELETS BLD QL SMEAR: PRESENT
LIPASE SERPL-CCNC: 92 U/L (ref 73–393)
LYMPHOCYTES # BLD AUTO: 0.88 THOUSAND/UL (ref 0.6–4.47)
LYMPHOCYTES # BLD AUTO: 16 % (ref 14–44)
MAGNESIUM SERPL-MCNC: 2.2 MG/DL (ref 1.6–2.6)
MCH RBC QN AUTO: 30.2 PG (ref 26.8–34.3)
MCHC RBC AUTO-ENTMCNC: 34.5 G/DL (ref 31.4–37.4)
MCV RBC AUTO: 88 FL (ref 82–98)
MONOCYTES # BLD AUTO: 0.16 THOUSAND/UL (ref 0–1.22)
MONOCYTES NFR BLD: 3 % (ref 4–12)
MUCOUS THREADS UR QL AUTO: ABNORMAL
NEUTROPHILS # BLD MANUAL: 4.45 THOUSAND/UL (ref 1.85–7.62)
NEUTS SEG NFR BLD AUTO: 81 % (ref 43–75)
NITRITE UR QL STRIP: NEGATIVE
NON-SQ EPI CELLS URNS QL MICRO: ABNORMAL /HPF
PH UR STRIP.AUTO: 6.5 [PH]
PLATELET # BLD AUTO: 180 THOUSANDS/UL (ref 149–390)
PLATELET BLD QL SMEAR: ADEQUATE
PMV BLD AUTO: 10.3 FL (ref 8.9–12.7)
POTASSIUM SERPL-SCNC: 3.7 MMOL/L (ref 3.5–5.3)
PROT SERPL-MCNC: 7.4 G/DL (ref 6.4–8.2)
PROT UR STRIP-MCNC: ABNORMAL MG/DL
RBC # BLD AUTO: 5.13 MILLION/UL (ref 3.88–5.62)
RBC #/AREA URNS AUTO: ABNORMAL /HPF
RBC MORPH BLD: NORMAL
SODIUM SERPL-SCNC: 131 MMOL/L (ref 136–145)
SP GR UR STRIP.AUTO: 1.02 (ref 1–1.03)
TROPONIN I SERPL-MCNC: <0.02 NG/ML
TSH SERPL DL<=0.05 MIU/L-ACNC: 0.9 UIU/ML (ref 0.36–3.74)
UROBILINOGEN UR QL STRIP.AUTO: 1 E.U./DL
WBC # BLD AUTO: 5.49 THOUSAND/UL (ref 4.31–10.16)
WBC #/AREA URNS AUTO: ABNORMAL /HPF

## 2021-09-20 PROCEDURE — 99222 1ST HOSP IP/OBS MODERATE 55: CPT | Performed by: FAMILY MEDICINE

## 2021-09-20 PROCEDURE — 96375 TX/PRO/DX INJ NEW DRUG ADDON: CPT

## 2021-09-20 PROCEDURE — 83690 ASSAY OF LIPASE: CPT | Performed by: EMERGENCY MEDICINE

## 2021-09-20 PROCEDURE — 93005 ELECTROCARDIOGRAM TRACING: CPT

## 2021-09-20 PROCEDURE — 84484 ASSAY OF TROPONIN QUANT: CPT | Performed by: EMERGENCY MEDICINE

## 2021-09-20 PROCEDURE — 99284 EMERGENCY DEPT VISIT MOD MDM: CPT

## 2021-09-20 PROCEDURE — 87040 BLOOD CULTURE FOR BACTERIA: CPT | Performed by: EMERGENCY MEDICINE

## 2021-09-20 PROCEDURE — XW033E5 INTRODUCTION OF REMDESIVIR ANTI-INFECTIVE INTO PERIPHERAL VEIN, PERCUTANEOUS APPROACH, NEW TECHNOLOGY GROUP 5: ICD-10-PCS | Performed by: FAMILY MEDICINE

## 2021-09-20 PROCEDURE — 96374 THER/PROPH/DIAG INJ IV PUSH: CPT

## 2021-09-20 PROCEDURE — 99291 CRITICAL CARE FIRST HOUR: CPT | Performed by: EMERGENCY MEDICINE

## 2021-09-20 PROCEDURE — 36415 COLL VENOUS BLD VENIPUNCTURE: CPT | Performed by: EMERGENCY MEDICINE

## 2021-09-20 PROCEDURE — 85025 COMPLETE CBC W/AUTO DIFF WBC: CPT | Performed by: EMERGENCY MEDICINE

## 2021-09-20 PROCEDURE — 80053 COMPREHEN METABOLIC PANEL: CPT | Performed by: EMERGENCY MEDICINE

## 2021-09-20 PROCEDURE — 71045 X-RAY EXAM CHEST 1 VIEW: CPT

## 2021-09-20 PROCEDURE — 84443 ASSAY THYROID STIM HORMONE: CPT | Performed by: EMERGENCY MEDICINE

## 2021-09-20 PROCEDURE — 83735 ASSAY OF MAGNESIUM: CPT | Performed by: EMERGENCY MEDICINE

## 2021-09-20 PROCEDURE — 83605 ASSAY OF LACTIC ACID: CPT | Performed by: EMERGENCY MEDICINE

## 2021-09-20 PROCEDURE — 85007 BL SMEAR W/DIFF WBC COUNT: CPT | Performed by: EMERGENCY MEDICINE

## 2021-09-20 PROCEDURE — 96361 HYDRATE IV INFUSION ADD-ON: CPT

## 2021-09-20 PROCEDURE — 81001 URINALYSIS AUTO W/SCOPE: CPT | Performed by: EMERGENCY MEDICINE

## 2021-09-20 PROCEDURE — 85027 COMPLETE CBC AUTOMATED: CPT | Performed by: EMERGENCY MEDICINE

## 2021-09-20 RX ORDER — SODIUM CHLORIDE 9 MG/ML
50 INJECTION, SOLUTION INTRAVENOUS CONTINUOUS
Status: DISCONTINUED | OUTPATIENT
Start: 2021-09-20 | End: 2021-09-21

## 2021-09-20 RX ORDER — CALCIUM CARBONATE 200(500)MG
1000 TABLET,CHEWABLE ORAL DAILY PRN
Status: DISCONTINUED | OUTPATIENT
Start: 2021-09-20 | End: 2021-09-22 | Stop reason: HOSPADM

## 2021-09-20 RX ORDER — CEFTRIAXONE 1 G/50ML
1000 INJECTION, SOLUTION INTRAVENOUS EVERY 24 HOURS
Status: DISCONTINUED | OUTPATIENT
Start: 2021-09-21 | End: 2021-09-21

## 2021-09-20 RX ORDER — CEFTRIAXONE 1 G/50ML
1000 INJECTION, SOLUTION INTRAVENOUS ONCE
Status: COMPLETED | OUTPATIENT
Start: 2021-09-20 | End: 2021-09-20

## 2021-09-20 RX ORDER — BENZONATATE 100 MG/1
100 CAPSULE ORAL ONCE
Status: COMPLETED | OUTPATIENT
Start: 2021-09-20 | End: 2021-09-20

## 2021-09-20 RX ORDER — ALBUTEROL SULFATE 90 UG/1
2 AEROSOL, METERED RESPIRATORY (INHALATION) ONCE
Status: COMPLETED | OUTPATIENT
Start: 2021-09-20 | End: 2021-09-20

## 2021-09-20 RX ORDER — ONDANSETRON 2 MG/ML
4 INJECTION INTRAMUSCULAR; INTRAVENOUS EVERY 6 HOURS PRN
Status: DISCONTINUED | OUTPATIENT
Start: 2021-09-20 | End: 2021-09-22 | Stop reason: HOSPADM

## 2021-09-20 RX ORDER — SODIUM CHLORIDE 9 MG/ML
3 INJECTION INTRAVENOUS
Status: DISCONTINUED | OUTPATIENT
Start: 2021-09-20 | End: 2021-09-22 | Stop reason: HOSPADM

## 2021-09-20 RX ORDER — ACETAMINOPHEN 325 MG/1
650 TABLET ORAL EVERY 6 HOURS PRN
Status: DISCONTINUED | OUTPATIENT
Start: 2021-09-20 | End: 2021-09-22 | Stop reason: HOSPADM

## 2021-09-20 RX ORDER — DEXAMETHASONE SODIUM PHOSPHATE 10 MG/ML
10 INJECTION, SOLUTION INTRAMUSCULAR; INTRAVENOUS ONCE
Status: COMPLETED | OUTPATIENT
Start: 2021-09-20 | End: 2021-09-20

## 2021-09-20 RX ORDER — ONDANSETRON 2 MG/ML
4 INJECTION INTRAMUSCULAR; INTRAVENOUS ONCE
Status: COMPLETED | OUTPATIENT
Start: 2021-09-20 | End: 2021-09-20

## 2021-09-20 RX ORDER — CEFTRIAXONE 1 G/50ML
1000 INJECTION, SOLUTION INTRAVENOUS EVERY 24 HOURS
Status: DISCONTINUED | OUTPATIENT
Start: 2021-09-20 | End: 2021-09-20

## 2021-09-20 RX ORDER — GUAIFENESIN 600 MG
600 TABLET, EXTENDED RELEASE 12 HR ORAL EVERY 12 HOURS SCHEDULED
Status: DISCONTINUED | OUTPATIENT
Start: 2021-09-20 | End: 2021-09-22 | Stop reason: HOSPADM

## 2021-09-20 RX ORDER — DEXAMETHASONE SODIUM PHOSPHATE 4 MG/ML
6 INJECTION, SOLUTION INTRA-ARTICULAR; INTRALESIONAL; INTRAMUSCULAR; INTRAVENOUS; SOFT TISSUE EVERY 24 HOURS
Status: DISCONTINUED | OUTPATIENT
Start: 2021-09-21 | End: 2021-09-22 | Stop reason: HOSPADM

## 2021-09-20 RX ORDER — ACETAMINOPHEN 325 MG/1
650 TABLET ORAL ONCE
Status: COMPLETED | OUTPATIENT
Start: 2021-09-20 | End: 2021-09-20

## 2021-09-20 RX ADMIN — CEFTRIAXONE 1000 MG: 1 INJECTION, SOLUTION INTRAVENOUS at 13:19

## 2021-09-20 RX ADMIN — DEXAMETHASONE SODIUM PHOSPHATE 10 MG: 10 INJECTION, SOLUTION INTRAMUSCULAR; INTRAVENOUS at 11:28

## 2021-09-20 RX ADMIN — ONDANSETRON 4 MG: 2 INJECTION INTRAMUSCULAR; INTRAVENOUS at 12:25

## 2021-09-20 RX ADMIN — ACETAMINOPHEN 650 MG: 325 TABLET ORAL at 11:27

## 2021-09-20 RX ADMIN — ENOXAPARIN SODIUM 90 MG: 100 INJECTION SUBCUTANEOUS at 15:46

## 2021-09-20 RX ADMIN — SODIUM CHLORIDE 50 ML/HR: 0.9 INJECTION, SOLUTION INTRAVENOUS at 15:58

## 2021-09-20 RX ADMIN — BENZONATATE 100 MG: 100 CAPSULE ORAL at 11:27

## 2021-09-20 RX ADMIN — SODIUM CHLORIDE 1000 ML: 0.9 INJECTION, SOLUTION INTRAVENOUS at 13:15

## 2021-09-20 RX ADMIN — DOXYCYCLINE 100 MG: 100 INJECTION, POWDER, LYOPHILIZED, FOR SOLUTION INTRAVENOUS at 13:57

## 2021-09-20 RX ADMIN — GUAIFENESIN 600 MG: 600 TABLET ORAL at 15:09

## 2021-09-20 RX ADMIN — REMDESIVIR 200 MG: 100 INJECTION, POWDER, LYOPHILIZED, FOR SOLUTION INTRAVENOUS at 15:09

## 2021-09-20 RX ADMIN — SODIUM CHLORIDE 1000 ML: 0.9 INJECTION, SOLUTION INTRAVENOUS at 11:28

## 2021-09-20 RX ADMIN — ALBUTEROL SULFATE 2 PUFF: 90 AEROSOL, METERED RESPIRATORY (INHALATION) at 12:25

## 2021-09-20 NOTE — H&P
[address]  H&P- Abe Xiong 1955, 73 y o  male MRN: [id number]  Unit/Bed#: ED 02 Encounter: [id number]  Primary Care Provider: Kade Candelaria DO   Date and time admitted to hospital: 9/20/2021 10:53 AM    * Acute respiratory failure with hypoxia [facility])  Assessment & Plan  Patient presented with acute respiratory failure with hypoxia due to pneumonia due to COVID-19  Pulse ox is 90% on room air  Currently requiring 2 L of oxygen due to tachypnea and hypoxia symptoms  Chest x-ray shows COVID-19 pneumonia    Pneumonia due to COVID-19 virus  Assessment & Plan  Patient has pneumonia due to COVID-19  Initially diagnosed with COVID-19 on September 8, 2021  Patient is on vaccinate  Will place on mild disease pathway with IV steroids, weight based anticoagulation and supportive care  Try to wean off oxygen if possible  Not candidate for convalescent plasma as symptoms are more than 5 days  Acute cystitis without hematuria  Assessment & Plan  UA is abnormal   Await urine culture results  Continue IV ceftriaxone for now    Hyponatremia  Assessment & Plan  Secondary to decreased oral intake and dehydration  Placed on IV fluid hydration and observe for now    Sepsis present on admission:  Secondary to COVID-19 pneumonia  Continue IV antibiotics and IV fluid hydration  VTE Prophylaxis: Enoxaparin (Lovenox)  / sequential compression device   Code Status:  Full code  POLST: There is no POLST form on file for this patient (pre-hospital)  Discussion with family:  Discussed with wife    Anticipated Length of Stay:  Patient will be admitted on an Inpatient basis with an anticipated length of stay of  more than 2 midnights  Justification for Hospital Stay:  Acute respiratory failure with hypoxia    Total Time for Visit, including Counseling / Coordination of Care: 60 minutes  Greater than 50% of this total time spent on direct patient counseling and coordination of care      Chief Complaint:   Shortness of breath    History of Present Illness:    Beau Cohn is a 73 y o  male who presents with shortness of breath, increasing fatigue  Patient states that he was diagnosed with COVID-19 on September 8, 2021  Also states that he has not been eating and drinking well  Feels very weak and dehydrated and fatigued  Still having fevers at home and has had a fever in the ER as well  Complaining of weight loss  Persistent cough       Review of Systems:    Review of Systems   Constitutional: Positive for appetite change, fatigue and fever  Negative for chills  HENT: Negative for hearing loss, sore throat and trouble swallowing  Eyes: Negative for photophobia, discharge and visual disturbance  Respiratory: Positive for cough  Negative for chest tightness and shortness of breath  Cardiovascular: Negative for chest pain and palpitations  Gastrointestinal: Positive for diarrhea and nausea  Negative for abdominal pain, blood in stool and vomiting  Endocrine: Negative for polydipsia and polyuria  Genitourinary: Negative for difficulty urinating, dysuria, flank pain and hematuria  Musculoskeletal: Negative for back pain and gait problem  Skin: Negative for rash  Allergic/Immunologic: Negative for environmental allergies and food allergies  Neurological: Negative for dizziness, seizures, syncope and headaches  Hematological: Does not bruise/bleed easily  Psychiatric/Behavioral: Negative for behavioral problems  All other systems reviewed and are negative  Past Medical and Surgical History:     History reviewed  No pertinent past medical history      Past Surgical History:   Procedure Laterality Date    HERNIA REPAIR      KNEE SURGERY Right 2012    meniscus and bone chip fx repaired    [place] LAP,APPENDECTOMY N/A 1/9/2020    Procedure: APPENDECTOMY LAPAROSCOPIC;  Surgeon: Marj Srivastava DO;  Location:  MAIN OR;  Service: General    VARICOSE VEIN SURGERY Bilateral Meds/Allergies:    Prior to Admission medications    Medication Sig Start Date End Date Taking? Authorizing Provider   Multiple Vitamin (MULTIVITAMIN) tablet Take 1 tablet by mouth daily    Historical Provider, MD     I have reviewed home medications with patient personally  Allergies: No Known Allergies    Social History:     Marital Status: /Civil Union   Social History     Substance and Sexual Activity   Alcohol Use Yes    Comment: socially     Social History     Tobacco Use   Smoking Status Never Smoker   Smokeless Tobacco Never Used     Social History     Substance and Sexual Activity   Drug Use Never       Family History:    Family History   Problem Relation Age of Onset    Hypertension Father        Physical Exam:     Vitals:   Blood Pressure: 116/50 (09/20/21 1358)  Pulse: 95 (09/20/21 1358)  Temperature: (!) 100 8 °F (38 2 °C) (09/20/21 1230)  Temp Source: Oral (09/20/21 1230)  Respirations: 18 (09/20/21 1358)  SpO2: 93 % (09/20/21 1358)    Physical Exam  Vitals and nursing note reviewed  Constitutional:       General: He is in acute distress  Appearance: He is ill-appearing  HENT:      Head: Normocephalic and atraumatic  Right Ear: External ear normal       Left Ear: External ear normal       Nose: Nose normal       Mouth/Throat:      Pharynx: Oropharynx is clear  Eyes:      Pupils: Pupils are equal, round, and reactive to light  Cardiovascular:      Rate and Rhythm: Regular rhythm  Tachycardia present  Heart sounds: Normal heart sounds  Pulmonary:      Effort: Respiratory distress present  Breath sounds: Rhonchi present  Abdominal:      General: Bowel sounds are normal       Palpations: Abdomen is soft  Tenderness: There is no abdominal tenderness  Musculoskeletal:         General: Normal range of motion  Cervical back: Normal range of motion and neck supple  Skin:     General: Skin is warm and dry        Capillary Refill: Capillary refill takes less than 2 seconds  Neurological:      General: No focal deficit present  Mental Status: He is alert and oriented to person, place, and time  Psychiatric:         Mood and Affect: Mood normal             Additional Data:     Lab Results: I have personally reviewed pertinent reports  Results from last 7 days   Lab Units 09/20/21  1126   WBC Thousand/uL 5 49   HEMOGLOBIN g/dL 15 5   HEMATOCRIT % 44 9   PLATELETS Thousands/uL 180   LYMPHO PCT % 16   MONO PCT % 3*   EOS PCT % 0     Results from last 7 days   Lab Units 09/20/21  1126   SODIUM mmol/L 131*   POTASSIUM mmol/L 3 7   CHLORIDE mmol/L 96*   CO2 mmol/L 25   BUN mg/dL 13   CREATININE mg/dL 0 97   ANION GAP mmol/L 10   CALCIUM mg/dL 8 3   ALBUMIN g/dL 2 9*   TOTAL BILIRUBIN mg/dL 0 71   ALK PHOS U/L 64   ALT U/L 43   AST U/L 66*   GLUCOSE RANDOM mg/dL 113                 Results from last 7 days   Lab Units 09/20/21  1126   LACTIC ACID mmol/L 1 5       Imaging: I have personally reviewed pertinent reports  X-ray chest 1 view portable   Final Result by Faina Tineo MD (09/20 1243)      Interval development of peripheral bilateral infiltrates suspicious for Covid pneumonia                  Workstation performed: [id number]             EKG, Pathology, and Other Studies Reviewed on Admission:   · EKG:  Sinus rhythm with tachycardia    Allscripts / Epic Records Reviewed: Yes     ** Please Note: This note has been constructed using a voice recognition system   **

## 2021-09-20 NOTE — ASSESSMENT & PLAN NOTE
Patient presented with acute respiratory failure with hypoxia due to pneumonia due to COVID-19  Pulse ox is 90% on room air  Currently requiring 2 L of oxygen due to tachypnea and hypoxia symptoms    Chest x-ray shows COVID-19 pneumonia

## 2021-09-20 NOTE — ED NOTES
Pt ambulated while on Pulse ox without nasal cannula  Oxygen stayed at 93 and dropped only to 92 when Pt made it to far side of the room  Oxygen went back up to 93 and Pt sat back down  No complaint of shortness of breath        Gomez Nathan  09/20/21 3161

## 2021-09-20 NOTE — ASSESSMENT & PLAN NOTE
Secondary to decreased oral intake and dehydration    Placed on IV fluid hydration and observe for now

## 2021-09-20 NOTE — ED PROVIDER NOTES
History  Chief Complaint   Patient presents with    Cough     Pt covid + 09/08/21 c/o persistent cough, fever (101 6) weakness and weight loss     70-year-old male with a past medical history of COVID-19 infection, DVT, appendicitis status post appendectomy diagnosed on September 8, 2021 presents with intermittent fevers, T-max 101 6°, generalized weakness, mild shortness of breath, fatigue, 17 lb weight loss over the last week or so and productive cough  Patient has also had a poor appetite and decreased urine output  Denies vomiting or diarrhea, chest pain  Patient's wife is also infected with COVID-19 in both of them are in the same ER room  Patient states he has such malaise and fatigue that he is unable to get out of bed at this point  When he goes from sitting to standing, patient states he is extremely lightheaded  No history of stroke, transient ischemic attack or vertigo  No recent change in medications  Patient did not get COVID-19 vaccination        History provided by:  Patient and medical records   used: No    Cough  Cough characteristics:  Productive  Sputum characteristics:  Yellow  Severity:  Moderate  Onset quality:  Gradual  Duration:  12 days  Timing:  Intermittent  Progression:  Worsening  Chronicity:  New  Context: sick contacts and upper respiratory infection    Context: not animal exposure, not exposure to allergens, not fumes, not occupational exposure, not smoke exposure, not weather changes and not with activity    Relieved by:  Nothing  Worsened by:  Deep breathing and activity  Ineffective treatments:  None tried  Associated symptoms: fever, shortness of breath, sinus congestion and weight loss    Associated symptoms: no chest pain, no chills, no diaphoresis, no ear fullness, no ear pain, no eye discharge, no headaches, no myalgias, no rash, no rhinorrhea, no sore throat and no wheezing    Fever:     Max temp PTA:  101 6    Temp source:  Oral    Progression: Waxing and waning  Shortness of breath:     Severity:  Mild    Onset quality:  Unable to specify    Timing:  Unable to specify    Progression:  Unable to specify  Weight loss:     Amount:  17 lbs  Risk factors: recent infection    Risk factors: no chemical exposure and no recent travel        Prior to Admission Medications   Prescriptions Last Dose Informant Patient Reported? Taking? Multiple Vitamin (MULTIVITAMIN) tablet 9/19/2021 at Unknown time  Yes Yes   Sig: Take 1 tablet by mouth daily      Facility-Administered Medications: None       History reviewed  No pertinent past medical history  Past Surgical History:   Procedure Laterality Date    HERNIA REPAIR      KNEE SURGERY Right 2012    meniscus and bone chip fx repaired    ND LAP,APPENDECTOMY N/A 1/9/2020    Procedure: APPENDECTOMY LAPAROSCOPIC;  Surgeon: Mita Sanford DO;  Location:  MAIN OR;  Service: General    VARICOSE VEIN SURGERY Bilateral        Family History   Problem Relation Age of Onset    Hypertension Father      I have reviewed and agree with the history as documented  E-Cigarette/Vaping    E-Cigarette Use Never User      E-Cigarette/Vaping Substances     Social History     Tobacco Use    Smoking status: Never Smoker    Smokeless tobacco: Never Used   Vaping Use    Vaping Use: Never used   Substance Use Topics    Alcohol use: Yes     Comment: socially    Drug use: Never       Review of Systems   Constitutional: Positive for activity change, appetite change, fever, unexpected weight change and weight loss  Negative for chills and diaphoresis  HENT: Negative  Negative for congestion, ear pain, rhinorrhea and sore throat  Eyes: Negative  Negative for discharge  Respiratory: Positive for cough and shortness of breath  Negative for wheezing  Cardiovascular: Negative for chest pain  Gastrointestinal: Negative  Negative for abdominal pain, constipation, diarrhea, nausea and vomiting  Endocrine: Negative  Genitourinary: Positive for decreased urine volume  Negative for difficulty urinating, flank pain and urgency  Musculoskeletal: Negative  Negative for back pain and myalgias  Skin: Negative  Negative for rash  Allergic/Immunologic: Negative  Neurological: Positive for weakness (generalized) and light-headedness  Negative for dizziness, facial asymmetry, numbness and headaches  Hematological: Negative  Psychiatric/Behavioral: Negative  All other systems reviewed and are negative  Physical Exam  Physical Exam  Vitals and nursing note reviewed  Exam conducted with a chaperone present  Constitutional:       General: He is not in acute distress  Appearance: He is well-developed and normal weight  He is ill-appearing  He is not toxic-appearing or diaphoretic  Comments: Ill appearing, in no acute distress   HENT:      Head: Normocephalic and atraumatic  Jaw: There is normal jaw occlusion  Right Ear: Hearing, tympanic membrane, ear canal and external ear normal  There is no impacted cerumen  No mastoid tenderness  No hemotympanum  Left Ear: Hearing, tympanic membrane, ear canal and external ear normal  There is no impacted cerumen  No mastoid tenderness  No hemotympanum  Nose: Nose normal       Right Nostril: No epistaxis  Left Nostril: No epistaxis  Right Sinus: No maxillary sinus tenderness or frontal sinus tenderness  Left Sinus: No maxillary sinus tenderness or frontal sinus tenderness  Mouth/Throat:      Lips: Pink  No lesions  Mouth: Mucous membranes are dry  No lacerations or angioedema  Tongue: No lesions  Tongue does not deviate from midline  Palate: No mass and lesions  Pharynx: Oropharynx is clear  Uvula midline  No pharyngeal swelling, oropharyngeal exudate, posterior oropharyngeal erythema or uvula swelling  Tonsils: No tonsillar exudate or tonsillar abscesses     Eyes:      General: Lids are normal  Vision grossly intact  Gaze aligned appropriately  No visual field deficit or scleral icterus  Right eye: No discharge  Left eye: No discharge  Extraocular Movements: Extraocular movements intact  Right eye: No nystagmus  Left eye: No nystagmus  Conjunctiva/sclera: Conjunctivae normal       Right eye: Right conjunctiva is not injected  Left eye: Left conjunctiva is not injected  Pupils: Pupils are equal, round, and reactive to light  Neck:      Thyroid: No thyroid mass or thyromegaly  Trachea: Trachea and phonation normal    Cardiovascular:      Rate and Rhythm: Normal rate and regular rhythm  Pulses: Normal pulses  Radial pulses are 2+ on the right side and 2+ on the left side  Dorsalis pedis pulses are 2+ on the right side and 2+ on the left side  Heart sounds: Normal heart sounds, S1 normal and S2 normal    Pulmonary:      Effort: Pulmonary effort is normal  No tachypnea, accessory muscle usage, respiratory distress or retractions  Breath sounds: Normal air entry  No stridor or decreased air movement  Examination of the right-upper field reveals decreased breath sounds  Examination of the left-upper field reveals decreased breath sounds  Examination of the right-middle field reveals decreased breath sounds  Examination of the left-middle field reveals decreased breath sounds  Examination of the right-lower field reveals decreased breath sounds  Examination of the left-lower field reveals decreased breath sounds  Decreased breath sounds present  No wheezing, rhonchi or rales  Comments: Poor inspiratory effort  Chest:      Chest wall: No tenderness  Abdominal:      General: Abdomen is flat  Bowel sounds are normal  There is no distension  Palpations: Abdomen is soft  Abdomen is not rigid  There is no mass  Tenderness: There is no abdominal tenderness   There is no right CVA tenderness, left CVA tenderness, guarding or rebound  Negative signs include West's sign and McBurney's sign  Hernia: No hernia is present  Genitourinary:     Penis: Normal        Testes: Normal    Musculoskeletal:         General: No swelling, tenderness, deformity or signs of injury  Normal range of motion  Cervical back: Full passive range of motion without pain, normal range of motion and neck supple  No rigidity  No spinous process tenderness or muscular tenderness  Right lower leg: No edema  Left lower leg: No edema  Lymphadenopathy:      Cervical: No cervical adenopathy  Skin:     General: Skin is warm and dry  Capillary Refill: Capillary refill takes less than 2 seconds  Coloration: Skin is not ashen, cyanotic, jaundiced, mottled, pale or sallow  Findings: No abrasion, abscess, acne, bruising, burn, ecchymosis, erythema, signs of injury, laceration, lesion, petechiae, rash or wound  Rash is not macular or papular  Neurological:      General: No focal deficit present  Mental Status: He is alert and oriented to person, place, and time  Mental status is at baseline  He is not disoriented  GCS: GCS eye subscore is 4  GCS verbal subscore is 5  GCS motor subscore is 6  Cranial Nerves: Cranial nerves are intact  No cranial nerve deficit, dysarthria or facial asymmetry  Sensory: Sensation is intact  No sensory deficit  Motor: Motor function is intact  No weakness, tremor, atrophy, abnormal muscle tone or seizure activity  Coordination: Coordination is intact  Coordination normal       Gait: Gait is intact  Gait normal       Comments: Patient is AAOx4, GCS 15; speaking clearly and appropriately; motor and sensation intact; visual fields intact; cranial nerves II-XII grossly intact; no facial droop, slurred speech or arm drift   Psychiatric:         Attention and Perception: Attention and perception normal  He is attentive           Mood and Affect: Mood and affect normal          Speech: Speech normal          Behavior: Behavior normal  Behavior is cooperative  Thought Content:  Thought content normal          Cognition and Memory: Cognition and memory normal          Judgment: Judgment normal          Vital Signs  ED Triage Vitals   Temperature Pulse Respirations Blood Pressure SpO2   09/20/21 1140 09/20/21 1140 09/20/21 1140 09/20/21 1140 09/20/21 1140   (!) 103 °F (39 4 °C) (!) 107 20 145/71 92 %      Temp Source Heart Rate Source Patient Position - Orthostatic VS BP Location FiO2 (%)   09/20/21 1140 09/20/21 1140 09/20/21 1358 09/20/21 1140 --   Oral Monitor Lying Left arm       Pain Score       09/20/21 1127       No Pain           Vitals:    09/21/21 0717 09/21/21 1517 09/22/21 0017 09/22/21 0759   BP: 126/73 135/81 135/82 135/83   Pulse: 85 88 87    Patient Position - Orthostatic VS: Lying            Visual Acuity      ED Medications  Medications   sodium chloride 0 9 % bolus 1,000 mL (0 mL Intravenous Stopped 9/20/21 1315)   dexamethasone (PF) (DECADRON) injection 10 mg (10 mg Intravenous Given 9/20/21 1128)   benzonatate (TESSALON PERLES) capsule 100 mg (100 mg Oral Given 9/20/21 1127)   acetaminophen (TYLENOL) tablet 650 mg (650 mg Oral Given 9/20/21 1127)   sodium chloride 0 9 % bolus 1,000 mL (0 mL Intravenous Stopped 9/20/21 1557)   ondansetron (ZOFRAN) injection 4 mg (4 mg Intravenous Given 9/20/21 1225)   albuterol (PROVENTIL HFA,VENTOLIN HFA) inhaler 2 puff (2 puffs Inhalation Given 9/20/21 1225)   cefTRIAXone (ROCEPHIN) IVPB (premix in dextrose) 1,000 mg 50 mL (0 mg Intravenous Stopped 9/20/21 1357)   doxycycline (VIBRAMYCIN) 100 mg in sodium chloride 0 9 % 100 mL IVPB (0 mg Intravenous Stopped 9/20/21 1500)   remdesivir (Veklury) 200 mg in sodium chloride 0 9 % 250 mL IVPB (0 mg Intravenous Stopped 9/20/21 1546)       Diagnostic Studies  Results Reviewed     Procedure Component Value Units Date/Time    Blood culture #1 [[id number]] Collected: 09/20/21 1129    Lab Status: Preliminary result Specimen: Blood from Arm, Left Updated: 09/23/21 1902     Blood Culture No Growth at 72 hrs  Blood culture #2 [[id number]] Collected: 09/20/21 1126    Lab Status: Preliminary result Specimen: Blood from Arm, Left Updated: 09/23/21 1902     Blood Culture No Growth at 72 hrs      Procalcitonin with AM Reflex [[id number]]  (Abnormal) Collected: 09/21/21 0500    Lab Status: Final result Specimen: Blood from Arm, Right Updated: 09/21/21 1104     Procalcitonin 0 83 ng/ml     C-reactive protein [[id number]]  (Abnormal) Collected: 09/21/21 0500    Lab Status: Final result Specimen: Blood from Arm, Right Updated: 09/21/21 0630      7 mg/L     Urine Microscopic [[id number]]  (Abnormal) Collected: 09/20/21 1227    Lab Status: Final result Specimen: Urine, Clean Catch Updated: 09/20/21 1311     RBC, UA 1-2 /hpf      WBC, UA 4-10 /hpf      Epithelial Cells Occasional /hpf      Bacteria, UA Moderate /hpf      Hyaline Casts, UA 1-2 /lpf      MUCUS THREADS Occasional    Manual Differential(PHLEBS Do Not Order) [[id number]]  (Abnormal) Collected: 09/20/21 1126    Lab Status: Final result Specimen: Blood from Arm, Left Updated: 09/20/21 1308     Segmented % 81 %      Lymphocytes % 16 %      Monocytes % 3 %      Eosinophils, % 0 %      Basophils % 0 %      Absolute Neutrophils 4 45 Thousand/uL      Lymphocytes Absolute 0 88 Thousand/uL      Monocytes Absolute 0 16 Thousand/uL      Eosinophils Absolute 0 00 Thousand/uL      Basophils Absolute 0 00 Thousand/uL      Total Counted --     RBC Morphology Normal     Platelet Estimate Adequate     Giant PLTs Present     Large Platelet Present    UA w Reflex to Microscopic w Reflex to Culture [[id number]]  (Abnormal) Collected: 09/20/21 1227    Lab Status: Final result Specimen: Urine, Clean Catch Updated: 09/20/21 1255     Color, UA Dk Yellow     Clarity, UA Slightly Cloudy     Specific Gravity, UA 1 025     pH, UA 6 5     Leukocytes, UA Negative     Nitrite, UA Negative Protein,  (2+) mg/dl      Glucose, UA Negative mg/dl      Ketones, UA >=80 (3+) mg/dl      Urobilinogen, UA 1 0 E U /dl      Bilirubin, UA Moderate     Blood, UA Trace-Intact    TSH, 3rd generation [[id number]]  (Normal) Collected: 09/20/21 1126    Lab Status: Final result Specimen: Blood from Arm, Left Updated: 09/20/21 1207     TSH 3RD GENERATON 0 900 uIU/mL     Narrative:      Patients undergoing fluorescein dye angiography may retain small amounts of fluorescein in the body for 48-72 hours post procedure  Samples containing fluorescein can produce falsely depressed TSH values  If the patient had this procedure,a specimen should be resubmitted post fluorescein clearance  Lactic acid, plasma [[id number]]  (Normal) Collected: 09/20/21 1126    Lab Status: Final result Specimen: Blood from Arm, Left Updated: 09/20/21 1205     LACTIC ACID 1 5 mmol/L     Narrative:      Result may be elevated if tourniquet was used during collection      Troponin I [[id number]]  (Normal) Collected: 09/20/21 1126    Lab Status: Final result Specimen: Blood from Arm, Left Updated: 09/20/21 1201     Troponin I <0 02 ng/mL     Comprehensive metabolic panel [[id number]]  (Abnormal) Collected: 09/20/21 1126    Lab Status: Final result Specimen: Blood from Arm, Left Updated: 09/20/21 1158     Sodium 131 mmol/L      Potassium 3 7 mmol/L      Chloride 96 mmol/L      CO2 25 mmol/L      ANION GAP 10 mmol/L      BUN 13 mg/dL      Creatinine 0 97 mg/dL      Glucose 113 mg/dL      Calcium 8 3 mg/dL      Corrected Calcium 9 2 mg/dL      AST 66 U/L      ALT 43 U/L      Alkaline Phosphatase 64 U/L      Total Protein 7 4 g/dL      Albumin 2 9 g/dL      Total Bilirubin 0 71 mg/dL      eGFR 82 ml/min/1 73sq m     Narrative:      Cathryn guidelines for Chronic Kidney Disease (CKD):     Stage 1 with normal or high GFR (GFR > 90 mL/min/1 73 square meters)    Stage 2 Mild CKD (GFR = 60-89 mL/min/1 73 square meters)   Stage 3A Moderate CKD (GFR = 45-59 mL/min/1 73 square meters)    Stage 3B Moderate CKD (GFR = 30-44 mL/min/1 73 square meters)    Stage 4 Severe CKD (GFR = 15-29 mL/min/1 73 square meters)    Stage 5 End Stage CKD (GFR <15 mL/min/1 73 square meters)  Note: GFR calculation is accurate only with a steady state creatinine    Lipase [[id number]]  (Normal) Collected: 09/20/21 1126    Lab Status: Final result Specimen: Blood from Arm, Left Updated: 09/20/21 1158     Lipase 92 u/L     Magnesium [[id number]]  (Normal) Collected: 09/20/21 1126    Lab Status: Final result Specimen: Blood from Arm, Left Updated: 09/20/21 1158     Magnesium 2 2 mg/dL     CBC and differential [[id number]]  (Normal) Collected: 09/20/21 1126    Lab Status: Final result Specimen: Blood from Arm, Left Updated: 09/20/21 1152     WBC 5 49 Thousand/uL      RBC 5 13 Million/uL      Hemoglobin 15 5 g/dL      Hematocrit 44 9 %      MCV 88 fL      MCH 30 2 pg      MCHC 34 5 g/dL      RDW 13 0 %      MPV 10 3 fL      Platelets 259 Thousands/uL     Narrative: This is an appended report  These results have been appended to a previously verified report  X-ray chest 1 view portable   Final Result by Edna Darling MD (09/20 1243)      Interval development of peripheral bilateral infiltrates suspicious for Covid pneumonia                  Workstation performed: [id number]                    Procedures  ECG 12 Lead Documentation Only    Date/Time: 9/20/2021 11:23 AM  Performed by: Syeda Pope MD  Authorized by:  Sydea Pope MD     ECG reviewed by me, the ED Provider: yes    Patient location:  ED  Previous ECG:     Comparison to cardiac monitor: Yes    Interpretation:     Interpretation: non-specific    Rate:     ECG rate:  114bpm    ECG rate assessment: tachycardic    Rhythm:     Rhythm: sinus tachycardia    Ectopy:     Ectopy: none    QRS:     QRS axis:  Normal  Conduction:     Conduction: normal    ST segments: ST segments:  Normal  T waves:     T waves: flattening and inverted      Flattening:  AVL    Inverted:  AVR  Comments:      No STEMI  DC 140ms  QRS 100ms  QT 454ms    Cardiac monitoring ordered  Heart rate and rhythm as above  I have personally read and interpreted the EKG as above  CriticalCare Time  Performed by: Demetra Rojas MD  Authorized by: Demetra Rojas MD     Critical care provider statement:     Critical care time (minutes):  30    Critical care was necessary to treat or prevent imminent or life-threatening deterioration of the following conditions:  Respiratory failure    Critical care was time spent personally by me on the following activities:  Development of treatment plan with patient or surrogate, discussions with consultants, evaluation of patient's response to treatment, examination of patient, review of old charts, re-evaluation of patient's condition, ordering and review of radiographic studies, ordering and review of laboratory studies and ordering and performing treatments and interventions    I assumed direction of critical care for this patient from another provider in my specialty: no    Comments:      Respiratory failure with hypoxia requiring 2 L nasal cannula, likely secondary to COVID-19 infection and bilateral pneumonia  ED Course  ED Course as of Sep 24 1608   Mon Sep 20, 2021   1302 CXR:IMPRESSION:     Interval development of peripheral bilateral infiltrates suspicious for Covid pneumonia         1302 Patient was ambulated on room air desaturated and 89% and became extremely short of breath tachypneic  He was returned to his stretcher and placed on 2 L nasal cannula and improved to 93%  Discussed with Dr Carmen Buckley, Hospitalist for COVID-19 pneumonia and respiratory failure with hypoxia who accepts patient  Will start IV Rocephin and Doxycyline  Updated patient                SBIRT 22yo+      Most Recent Value   SBIRT (22 yo +)   In order to provide better care to our patients, we are screening all of our patients for alcohol and drug use  Would it be okay to ask you these screening questions? Yes Filed at: 09/20/2021 1057   Initial Alcohol Screen: US AUDIT-C    1  How often do you have a drink containing alcohol?  0 Filed at: 09/20/2021 1057   2  How many drinks containing alcohol do you have on a typical day you are drinking? 0 Filed at: 09/20/2021 1057   3a  Male UNDER 65: How often do you have five or more drinks on one occasion? 0 Filed at: 09/20/2021 1057   3b  FEMALE Any Age, or MALE 65+: How often do you have 4 or more drinks on one occassion? 0 Filed at: 09/20/2021 1057   Audit-C Score  0 Filed at: 09/20/2021 1057   MARTIN: How many times in the past year have you    Used an illegal drug or used a prescription medication for non-medical reasons?   Never Filed at: 09/20/2021 1057        MDM  Number of Diagnoses or Management Options     Amount and/or Complexity of Data Reviewed  Clinical lab tests: reviewed and ordered  Tests in the radiology section of CPT®: ordered and reviewed  Tests in the medicine section of CPT®: ordered and reviewed  Review and summarize past medical records: yes  Discuss the patient with other providers: yes (Hospitalist, Dr Saba Mckinney)  Independent visualization of images, tracings, or specimens: yes (Chest x-ray, EKG)    Risk of Complications, Morbidity, and/or Mortality  Presenting problems: moderate  Diagnostic procedures: moderate  Management options: moderate    Patient Progress  Patient progress: stable      Disposition  Final diagnoses:   Pneumonia due to COVID-19 virus   Respiratory failure with hypoxia ([facility] [address] )   Fever   Acute respiratory failure with hypoxia ([facility] [address] )     Time reflects when diagnosis was documented in both MDM as applicable and the Disposition within this note     Time User Action Codes Description Comment    9/20/2021  1:10 PM Gina Banuelos Add [U07 1,  J12 82] Pneumonia due to COVID-19 virus     9/20/2021 1:10 PM Roxana Eaton Add [J96 91] Respiratory failure with hypoxia ([address] )     9/22/2021  9:23 AM Aravind Garcia Add [N30 00] Acute cystitis without hematuria     9/24/2021  4:06 PM Roxana Eaton Add [R50 9] Fever     9/24/2021  4:07 PM Roxana Eaton Add [J96 01] Acute respiratory failure with hypoxia [facility])       ED Disposition     ED Disposition Condition Date/Time Comment    Admit Stable Mon Sep 20, 2021  1:10 PM Case was discussed with Dr Susan Campos and the patient's admission status was agreed to be Admission Status: inpatient status to the service of Dr Susan Campos   Follow-up Information    None         Discharge Medication List as of 9/22/2021 10:33 AM      START taking these medications    Details   acetaminophen (TYLENOL) 325 mg tablet Take 2 tablets (650 mg total) by mouth every 6 (six) hours as needed for mild pain, Starting Wed 9/22/2021, No Print      cephalexin (KEFLEX) 500 mg capsule Take 1 capsule (500 mg total) by mouth every 12 (twelve) hours for 4 days, Starting Wed 9/22/2021, Until Sun 9/26/2021, Normal      dexamethasone (DECADRON) 6 mg tablet Take 1 tablet (6 mg total) by mouth daily with breakfast for 7 days, Starting Wed 9/22/2021, Until Wed 9/29/2021, Normal         CONTINUE these medications which have NOT CHANGED    Details   Multiple Vitamin (MULTIVITAMIN) tablet Take 1 tablet by mouth daily, Historical Med           No discharge procedures on file      PDMP Review     None          ED Provider  Electronically Signed by    MD Cuate Grover MD  09/24/21 6727

## 2021-09-20 NOTE — ASSESSMENT & PLAN NOTE
Patient has pneumonia due to COVID-19  Initially diagnosed with COVID-19 on September 8, 2021  Patient is on vaccinate  Will place on mild disease pathway with IV steroids, weight based anticoagulation and supportive care  Try to wean off oxygen if possible  Not candidate for convalescent plasma as symptoms are more than 5 days

## 2021-09-21 PROBLEM — E43 SEVERE PROTEIN-CALORIE MALNUTRITION (HCC): Status: ACTIVE | Noted: 2021-09-21

## 2021-09-21 LAB
ALBUMIN SERPL BCP-MCNC: 2.3 G/DL (ref 3.5–5)
ALP SERPL-CCNC: 58 U/L (ref 46–116)
ALT SERPL W P-5'-P-CCNC: 56 U/L (ref 12–78)
ANION GAP SERPL CALCULATED.3IONS-SCNC: 11 MMOL/L (ref 4–13)
AST SERPL W P-5'-P-CCNC: 56 U/L (ref 5–45)
ATRIAL RATE: 114 BPM
BILIRUB SERPL-MCNC: 0.38 MG/DL (ref 0.2–1)
BUN SERPL-MCNC: 15 MG/DL (ref 5–25)
CALCIUM ALBUM COR SERPL-MCNC: 9.3 MG/DL (ref 8.3–10.1)
CALCIUM SERPL-MCNC: 7.9 MG/DL (ref 8.3–10.1)
CHLORIDE SERPL-SCNC: 102 MMOL/L (ref 100–108)
CO2 SERPL-SCNC: 27 MMOL/L (ref 21–32)
CREAT SERPL-MCNC: 0.82 MG/DL (ref 0.6–1.3)
CRP SERPL QL: 154.7 MG/L
ERYTHROCYTE [DISTWIDTH] IN BLOOD BY AUTOMATED COUNT: 13.2 % (ref 11.6–15.1)
GFR SERPL CREATININE-BSD FRML MDRD: 93 ML/MIN/1.73SQ M
GLUCOSE SERPL-MCNC: 150 MG/DL (ref 65–140)
HCT VFR BLD AUTO: 42.3 % (ref 36.5–49.3)
HGB BLD-MCNC: 14 G/DL (ref 12–17)
MCH RBC QN AUTO: 29.9 PG (ref 26.8–34.3)
MCHC RBC AUTO-ENTMCNC: 33.1 G/DL (ref 31.4–37.4)
MCV RBC AUTO: 90 FL (ref 82–98)
P AXIS: 80 DEGREES
PLATELET # BLD AUTO: 197 THOUSANDS/UL (ref 149–390)
PMV BLD AUTO: 10.4 FL (ref 8.9–12.7)
POTASSIUM SERPL-SCNC: 3.6 MMOL/L (ref 3.5–5.3)
PR INTERVAL: 140 MS
PROCALCITONIN SERPL-MCNC: 0.83 NG/ML
PROT SERPL-MCNC: 6.5 G/DL (ref 6.4–8.2)
QRS AXIS: 45 DEGREES
QRSD INTERVAL: 100 MS
QT INTERVAL: 330 MS
QTC INTERVAL: 454 MS
RBC # BLD AUTO: 4.69 MILLION/UL (ref 3.88–5.62)
SODIUM SERPL-SCNC: 140 MMOL/L (ref 136–145)
T WAVE AXIS: 61 DEGREES
TSH SERPL DL<=0.05 MIU/L-ACNC: 0.83 UIU/ML (ref 0.36–3.74)
VENTRICULAR RATE: 114 BPM
WBC # BLD AUTO: 4.41 THOUSAND/UL (ref 4.31–10.16)

## 2021-09-21 PROCEDURE — 85027 COMPLETE CBC AUTOMATED: CPT | Performed by: FAMILY MEDICINE

## 2021-09-21 PROCEDURE — 84443 ASSAY THYROID STIM HORMONE: CPT | Performed by: FAMILY MEDICINE

## 2021-09-21 PROCEDURE — 80053 COMPREHEN METABOLIC PANEL: CPT | Performed by: FAMILY MEDICINE

## 2021-09-21 PROCEDURE — 99232 SBSQ HOSP IP/OBS MODERATE 35: CPT | Performed by: FAMILY MEDICINE

## 2021-09-21 PROCEDURE — 84145 PROCALCITONIN (PCT): CPT | Performed by: FAMILY MEDICINE

## 2021-09-21 PROCEDURE — 86140 C-REACTIVE PROTEIN: CPT | Performed by: FAMILY MEDICINE

## 2021-09-21 RX ORDER — CEPHALEXIN 500 MG/1
500 CAPSULE ORAL EVERY 12 HOURS SCHEDULED
Status: DISCONTINUED | OUTPATIENT
Start: 2021-09-21 | End: 2021-09-22 | Stop reason: HOSPADM

## 2021-09-21 RX ADMIN — REMDESIVIR 100 MG: 100 INJECTION, POWDER, LYOPHILIZED, FOR SOLUTION INTRAVENOUS at 15:39

## 2021-09-21 RX ADMIN — GUAIFENESIN 600 MG: 600 TABLET ORAL at 20:56

## 2021-09-21 RX ADMIN — CEPHALEXIN 500 MG: 500 CAPSULE ORAL at 11:11

## 2021-09-21 RX ADMIN — ENOXAPARIN SODIUM 90 MG: 100 INJECTION SUBCUTANEOUS at 07:27

## 2021-09-21 RX ADMIN — CEPHALEXIN 500 MG: 500 CAPSULE ORAL at 20:56

## 2021-09-21 RX ADMIN — GUAIFENESIN 600 MG: 600 TABLET ORAL at 07:28

## 2021-09-21 RX ADMIN — DEXAMETHASONE SODIUM PHOSPHATE 6 MG: 4 INJECTION INTRA-ARTICULAR; INTRALESIONAL; INTRAMUSCULAR; INTRAVENOUS; SOFT TISSUE at 11:11

## 2021-09-21 NOTE — ASSESSMENT & PLAN NOTE
Patient has pneumonia due to COVID-19  Initially diagnosed with COVID-19 on September 8, 2021  Patient is unvaccinated  Will place on mild disease pathway with IV steroids, weight based anticoagulation and supportive care  Try to wean off oxygen if possible  Not candidate for convalescent plasma as symptoms are more than 5 days    Will downgrade to DVT prophylaxis dose for anticoagulation as patient is now weaned off to room air

## 2021-09-21 NOTE — PROGRESS NOTES
Pt reports for about a week only having applesauce and a bowl of cheerios in the entire day due to poor appetite  Says has now resolved/improved, able to complete full meals 3x per day  Limited wt hx per chart review  7/14/20 226lb, 9/20/21 205lb  Pt reports weighing 215lb [birth year] when he was dx with COVID  4 7% wt loss x 1 5 week  Meets criteria for acute malnutrition  Pt's intake has improved, consuming 100% of meals now  Will order ensure enlive daily at this time, increase as indicated  Maintain regular diet as ordered  Recommend daily weights  Will follow up

## 2021-09-21 NOTE — ASSESSMENT & PLAN NOTE
UA is abnormal   Await urine culture results    Discontinue ceftriaxone and placed on a 3 day course of oral Keflex

## 2021-09-21 NOTE — MALNUTRITION/BMI
This medical record reflects one or more clinical indicators suggestive of malnutrition  Malnutrition Findings:   Adult Malnutrition type: Acute illness (related to COVID19, decreased appetite as evidenced by < 50% energy intake > 5 days, 4 7% wt loss x 1 5 weeks (9/8/21 215lb, 9/20/21 205lb)  )  Adult Degree of Malnutrition: Other severe protein calorie malnutrition (Treated with: Regular diet, ensure enlive daily, daily weights)  Malnutrition Characteristics: Inadequate energy, Weight loss    BMI Findings: Body mass index is 26 35 kg/m²  See Nutrition note dated 9/21/21 for additional details  Completed nutrition assessment is viewable in the nutrition documentation

## 2021-09-21 NOTE — UTILIZATION REVIEW
Initial Clinical Review    Admission: Date/Time/Statement:   Admission Orders (From admission, onward)     Ordered        09/20/21 1311  Inpatient Admission  Once                   Orders Placed This Encounter   Procedures    Inpatient Admission     Standing Status:   Standing     Number of Occurrences:   1     Order Specific Question:   Level of Care     Answer:   Med Surg [16]     Order Specific Question:   Estimated length of stay     Answer:   More than 2 Midnights     Order Specific Question:   Certification     Answer:   I certify that inpatient services are medically necessary for this patient for a duration of greater than two midnights  See H&P and MD Progress Notes for additional information about the patient's course of treatment  ED Arrival Information     Expected Arrival Acuity    - 9/20/2021 09:12 Urgent         Means of arrival Escorted by Service Admission type    Walk-In Self Hospitalist Urgent         Arrival complaint    covid+, fever, losing weight, no energy        Chief Complaint   Patient presents with    Cough     Pt covid + 09/08/21 c/o persistent cough, fever (101 6) weakness and weight loss       Initial Presentation: 73year old male to the ED from home with complaints of weakness, fever, weight loss, COVID + on 9/8/21  Admitted to inpatient for acute respiratory failure with hypoxia, pneumonia due to COVID 19  He has lost weight  Arrives tachycardic, febrile with respiratory distress, rhonchi in lungs  CXR shows: interval development of peripheral bilateral infiltrates suspicious for covid pneumonia  Requiring [facility]  IV steroids, anticoag started  UA abnormal   Started on IV abx  IV fluids  Urine cultures pending  Elevated CRP  Date: 9/21   Day 2: Continues with 2LNC  Wean as able  Unvaccinated   Adult Malnutrition type: Acute illness (related to COVID19, decreased appetite as evidenced by < 50% energy intake > 5 days, 4 7% wt loss x 1 5 weeks (9/8/21 215lb, 9/20/21 205lb)  ) Had diarrhea at home  Sodium improved  DC ivfluids       ED Triage Vitals   Temperature Pulse Respirations Blood Pressure SpO2   09/20/21 1140 09/20/21 1140 09/20/21 1140 09/20/21 1140 09/20/21 1140   (!) 103 °F (39 4 °C) (!) 107 20 145/71 92 %      Temp Source Heart Rate Source Patient Position - Orthostatic VS BP Location FiO2 (%)   09/20/21 1140 09/20/21 1140 09/20/21 1358 09/20/21 1140 --   Oral Monitor Lying Left arm       Pain Score       09/20/21 1127       No Pain          Wt Readings from Last 1 Encounters:   09/20/21 93 1 kg (205 lb 4 oz)     Additional Vital Signs:   Time Temp Pulse  Resp  BP  MAP (mmHg)  SpO2  Calculated FIO2 (%) - Nasal Cannula  Nasal Cannula O2 Flow Rate (L/min)  O2 Device Patient Position - Orthostatic VS   09/21/21 0919 -- --  --  --  --  93 %  --  --  None (Room air) --   09/21/21 07:17:14 97 5 °F (36 4 °C) 85  18  126/73  91  93 %  28  2 L/min  Nasal cannula Lying   09/20/21 2351 -- --  --  --  --  91 %  28  2 L/min  Nasal cannula --   09/20/21 22:02:54 97 3 °F (36 3 °C)Abnormal  87  16  126/74  91  90 %  --  --  -- --   09/20/21 1732 -- --  --  --  --  --  28  2 L/min  Nasal cannula --   09/20/21 17:14:15 97 5 °F (36 4 °C) 90  18  126/74  91  91 %  --  --  -- --   09/20/21 1510 -- 85  20  115/60  --  95 %  --  --  Nasal cannula Lying   09/20/21 1358 -- 95  18  116/50  --  93 %  --  --  Nasal cannula Lying   09/20/21 1230 100 8 °F (38 2 °C)Abnormal  98  18  116/60  --  92 %  --  --  Nasal cannula --   09/20/21 1140 103 °F (39 4 °C)Abnormal  107Abnormal   20  145/71  --  92 %  --  --  None (Room air      Pertinent Labs/Diagnostic Test Results:   9/20 CXR: Interval development of peripheral bilateral infiltrates suspicious for Covid pneumonia   9/21 EKG: Sinus tachycardia  RSR' or QR pattern in V1 suggests right ventricular conduction delay  Tracing otherwise unremarkable  When compared with ECG of 09-JAN-2020 05:20,  No significant change was found  9/20 CXR:    Interval development of peripheral bilateral infiltrates suspicious for Covid pneumonia            Results from last 7 days   Lab Units 09/21/21  0500 09/20/21  1126   WBC Thousand/uL 4 41 5 49   HEMOGLOBIN g/dL 14 0 15 5   HEMATOCRIT % 42 3 44 9   PLATELETS Thousands/uL 197 180         Results from last 7 days   Lab Units 09/21/21  0500 09/20/21  1126   SODIUM mmol/L 140 131*   POTASSIUM mmol/L 3 6 3 7   CHLORIDE mmol/L 102 96*   CO2 mmol/L 27 25   ANION GAP mmol/L 11 10   BUN mg/dL 15 13   CREATININE mg/dL 0 82 0 97   EGFR ml/min/1 73sq m 93 82   CALCIUM mg/dL 7 9* 8 3   MAGNESIUM mg/dL  --  2 2     Results from last 7 days   Lab Units 09/21/21  0500 09/20/21  1126   AST U/L 56* 66*   ALT U/L 56 43   ALK PHOS U/L 58 64   TOTAL PROTEIN g/dL 6 5 7 4   ALBUMIN g/dL 2 3* 2 9*   TOTAL BILIRUBIN mg/dL 0 38 0 71         Results from last 7 days   Lab Units 09/21/21  0500 09/20/21  1126   GLUCOSE RANDOM mg/dL 150* 113     Results from last 7 days   Lab Units 09/20/21  1126   TROPONIN I ng/mL <0 02     Results from last 7 days   Lab Units 09/21/21  0500 09/20/21  1126   TSH 3RD GENERATON uIU/mL 0 826 0 900       Results from last 7 days   Lab Units 09/20/21  1126   LACTIC ACID mmol/L 1 5       Results from last 7 days   Lab Units 09/20/21  1126   LIPASE u/L 92     Results from last 7 days   Lab Units 09/21/21  0500   CRP mg/L 154 7*       Results from last 7 days   Lab Units 09/20/21  1227   CLARITY UA  Slightly Cloudy   COLOR UA  Dk Yellow   SPEC GRAV UA  1 025   PH UA  6 5   GLUCOSE UA mg/dl Negative   KETONES UA mg/dl >=80 (3+)*   BLOOD UA  Trace-Intact*   PROTEIN UA mg/dl 100 (2+)*   NITRITE UA  Negative   BILIRUBIN UA  Moderate*   UROBILINOGEN UA E U /dl 1 0   LEUKOCYTES UA  Negative   WBC UA /hpf 4-10*   RBC UA /hpf 1-2   BACTERIA UA /hpf Moderate*   EPITHELIAL CELLS WET PREP /hpf Occasional   MUCUS THREADS  Occasional*         Results from last 7 days   Lab Units 09/20/21  1129 09/20/21  1126   BLOOD CULTURE  Received in Microbiology Lab  Culture in Progress  Received in Microbiology Lab  Culture in Progress       ED Treatment:   Medication Administration from 09/20/2021 0911 to 09/20/2021 1714       Date/Time Order Dose Route Action     09/20/2021 1128 sodium chloride 0 9 % bolus 1,000 mL 1,000 mL Intravenous New Bag     09/20/2021 1128 dexamethasone (PF) (DECADRON) injection 10 mg 10 mg Intravenous Given     09/20/2021 1127 benzonatate (TESSALON PERLES) capsule 100 mg 100 mg Oral Given     09/20/2021 1127 acetaminophen (TYLENOL) tablet 650 mg 650 mg Oral Given     09/20/2021 1315 sodium chloride 0 9 % bolus 1,000 mL 1,000 mL Intravenous New Bag     09/20/2021 1225 ondansetron (ZOFRAN) injection 4 mg 4 mg Intravenous Given     09/20/2021 1225 albuterol (PROVENTIL HFA,VENTOLIN HFA) inhaler 2 puff 2 puff Inhalation Given     09/20/2021 1319 cefTRIAXone (ROCEPHIN) IVPB (premix in dextrose) 1,000 mg 50 mL 1,000 mg Intravenous New Bag     09/20/2021 1357 doxycycline (VIBRAMYCIN) 100 mg in sodium chloride 0 9 % 100 mL IVPB 100 mg Intravenous New Bag     09/20/2021 1546 enoxaparin (LOVENOX) subcutaneous injection 90 mg 90 mg Subcutaneous Given     09/20/2021 1509 remdesivir (Veklury) 200 mg in sodium chloride 0 9 % 250 mL IVPB 200 mg Intravenous New Bag     09/20/2021 1509 guaiFENesin (MUCINEX) 12 hr tablet 600 mg 600 mg Oral Given     09/20/2021 1558 sodium chloride 0 9 % infusion 50 mL/hr Intravenous New Bag          Admitting Diagnosis: Respiratory failure with hypoxia (HCC) [J96 91]  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]  Pneumonia due to COVID-19 virus [U07 1, J12 82]  Age/Sex: 73 y o  male  Admission Orders:  SCDs   Up and OOB  Procal  Scheduled Medications:  cephalexin, 500 mg, Oral, Q12H [address]  dexamethasone, 6 mg, Intravenous, Q24H  [START ON 9/22/2021] enoxaparin, 40 mg, Subcutaneous, Q24H KRYSTINA  guaiFENesin, 600 mg, Oral, Q12H [address]  remdesivir, 100 mg, Intravenous, Q24H      Continuous IV Infusions:     PRN Meds:  acetaminophen, 650 mg, Oral, Q6H PRN  calcium carbonate, 1,000 mg, Oral, Daily PRN  ondansetron, 4 mg, Intravenous, Q6H PRN  sodium chloride (PF), 3 mL, Intravenous, Q1H PRN        Network Utilization Review Department  ATTENTION: Please call with any questions or concerns to [phone] and carefully listen to the prompts so that you are directed to the right person  All voicemails are confidential   Kike Sloop all requests for admission clinical reviews, approved or denied determinations and any other requests to dedicated fax number below belonging to the campus where the patient is receiving treatment   List of dedicated fax numbers for the Facilities:  [address] DENIALS (Administrative/Medical Necessity) [phone]   [address] (Maternity/NICU/Pediatrics) [phone]   [address] [place] [address] Dr Ramila Odom [id number] [place] [address] Shyanne Patt Kaur 1481 [address] [place] [address] [phone]

## 2021-09-21 NOTE — ASSESSMENT & PLAN NOTE
Patient presented with acute respiratory failure with hypoxia due to pneumonia due to COVID-19  Pulse ox is 90% on room air  Currently requiring 2 L of oxygen due to tachypnea and hypoxia symptoms    Chest x-ray shows COVID-19 pneumonia  Try to wean off oxygen completely today

## 2021-09-21 NOTE — UTILIZATION REVIEW
Inpatient Admission Authorization Request   NOTIFICATION OF INPATIENT ADMISSION/INPATIENT AUTHORIZATION REQUEST   SERVICING FACILITY:   [address] [place] [address]  Vance Beasley [address] [place] [address], [address]  Tax ID: [id number]  NPI: [id number]  Place of Service: Inpatient [facility]  [address]  Place of Service Code: 24     ATTENDING PROVIDER:  Attending Name and NPI#: Symone Szymanski Md [[id number]]  Address: Vance Beasley  [facility] [address]  Phone: [phone]     UTILIZATION REVIEW CONTACT:  Harvinder Mckeon, Utilization   Network Utilization Review Department  Phone: [phone]  Fax [phone]  Email: [email or web address]     PHYSICIAN ADVISORY SERVICES:  FOR REKT-LZ-NNSD REVIEW - MEDICAL NECESSITY DENIAL  Phone: [phone]  Fax: [phone]  Email: [email or web address]     TYPE OF REQUEST:  Inpatient Status     ADMISSION INFORMATION:  ADMISSION DATE/TIME: 9/20/21  1:11 PM  PATIENT DIAGNOSIS CODE/DESCRIPTION:  Respiratory failure with hypoxia (HCC) [J96 91]  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]  Pneumonia due to COVID-19 virus [U07 1, J12 82]  DISCHARGE DATE/TIME: No discharge date for patient encounter  DISCHARGE DISPOSITION (IF DISCHARGED): Home/Self Care     IMPORTANT INFORMATION:  Please contact the Harvinder Mckeon directly with any questions or concerns regarding this request  Department voicemails are confidential     Send requests for admission clinical reviews, concurrent reviews, approvals, and administrative denials due to lack of clinical to fax [phone]

## 2021-09-21 NOTE — CASE MANAGEMENT
Case Management Assessment & Discharge Planning Note    Patient name Herman Dumont  Location [facility] [address] 337/-69 MRN [id number]  : 1955 Date 2021       Current Admission Date: 2021  Current Admission Diagnosis:  Acute respiratory failure with hypoxia ([address] )  Previous Admission - Discharge Date:1/10/20   LOS (days): 1  Geometric Mean LOS (GMLOS) (days): 5 40  Days to GMLOS:4 4 Previous Discharge Diagnosis:  There are no discharge diagnoses documented for the most recent discharge  OBJECTIVE:    Risk of Unplanned Readmission Score: 11   Bundle(if applicable):    Current admission status: Inpatient  Referral Reason: Other (self referral for discharge planning)    Preferred Pharmacy:   [facility], [address] [address]  Phone: [phone] Fax: [phone]    [id number] [address]  Phone: [phone] Fax: [phone]    Primary Care Provider: Lorine Schwab, DO    Primary Insurance: BLUE CROSS  Secondary Insurance: MEDICARE    ASSESSMENT:  Active Health Care Agents    There are no active Health Care Agents on file                                                           DISCHARGE DETAILS:

## 2021-09-21 NOTE — ASSESSMENT & PLAN NOTE
Secondary to decreased oral intake and dehydration  Placed on IV fluid hydration and observe for now  Improved    Discontinue IV fluids

## 2021-09-21 NOTE — ASSESSMENT & PLAN NOTE
Malnutrition Findings:   Adult Malnutrition type: Acute illness (related to COVID19, decreased appetite as evidenced by < 50% energy intake > 5 days, 4 7% wt loss x 1 5 weeks (9/8/21 215lb, 9/20/21 205lb)  )  Adult Degree of Malnutrition: Other severe protein calorie malnutrition (Treated with: Regular diet, ensure enlive daily, daily weights)    BMI Findings: Body mass index is 26 35 kg/m²  Encourage oral intake    Patient has lost weight secondary to COVID-19 infection diarrhea

## 2021-09-21 NOTE — CASE MANAGEMENT
Case Management Assessment & Discharge Planning Note    Patient name Abel Venturah  Location [facility] [address] 337/-39 MRN [id number]  : 1955 Date 2021       Current Admission Date: 2021  Current Admission Diagnosis:  Acute respiratory failure with hypoxia ([address] )  Previous Admission - Discharge Date:1/10/20   LOS (days): 1  Geometric Mean LOS (GMLOS) (days): 5 40  Days to GMLOS:4 4 Previous Discharge Diagnosis:  There are no discharge diagnoses documented for the most recent discharge  OBJECTIVE:    Risk of Unplanned Readmission Score: 11   Bundle(if applicable):    Current admission status: Inpatient  Referral Reason:  (Self referral for dc planning)    Preferred Pharmacy:   [id number] Kristi Paulson, [address] [place] [address]  Phone: [phone] Fax: [phone]    [id number] [address]  Phone: [phone] Fax: [phone]    Primary Care Provider: Amaury Villavicencio DO    Primary Insurance: BLUE CROSS  Secondary Insurance: MEDICARE    ASSESSMENT:    Admitted with Covid  CM met with patient at the bedside,baseline information  was obtained  CM discussed the role of CM in helping the patient develop a discharge plan and assist the patient in carry out their plan  Active Health Care Agents    There are no active Health Care Agents on file  Readmission Root Cause  30 Day Readmission: No    Patient Information  Admitted from[de-identified] Home  Mental Status: Alert  During Assessment patient was accompanied by: Not accompanied during assessment  Assessment information provided by[de-identified] Patient  Primary Caregiver: Self  Support Systems: Spouse/significant other  [place] of Residence: [facility] do you live in?: [address] entry access options   Select all that apply : Stairs (no steps from garage to first floor -13 steps from first floor to second floor)  Number of steps to enter home :  (no steps from garage to first floor -13 steps from first floor to second floor)  Do the steps have railings?: Yes (stairs to second floor)  Type of Current Residence: 2 story home  Upon entering residence, is there a bedroom on the main floor (no further steps)?: No  A bedroom is located on the following floor levels of residence (select all that apply):: 2nd Floor  Upon entering residence, is there a bathroom on the main floor (no further steps)?: Yes  Indicate which floors of current residence have a bathroom (select all the apply):: 2nd Floor  Number of steps to 2nd floor from main floor:  (13 first floor to second floor)  Living Arrangements: Lives w/ Spouse/significant other  Is patient a ?: No    Activities of Daily Living Prior to Admission  Functional Status: Independent  Completes ADLs independently?: Yes  Does patient use assisted devices?: No  Does patient currently own DME?: No  Does patient have a history of Outpatient Therapy (PT/OT)?: No  Does the patient have a history of Short-Term Rehab?: No  Does patient currently have [facility]?: No         Patient Information Continued  Income Source: Employed  Does patient have prescription coverage?: Yes  Does patient have a history of substance abuse?: No         Means of Transportation  Means of Transport to Appts[de-identified] Family transport    DISCHARGE DETAILS:    Discharge planning discussed with[de-identified] patient  Freedom of Choice: Yes            [address]         Is the patient interested in [facility] at discharge?: No    DME Referral Provided  Referral made for DME?: No              Discharge Destination Plan[de-identified] Home     Type of Transport: Auto with designated                        Accompanied by: Significant other

## 2021-09-21 NOTE — PROGRESS NOTES
[address]  Progress Note - Herman Dumont 1955, 73 y o  male MRN: [id number]  Unit/Bed#: MS Jp-01 Encounter: [id number]  Primary Care Provider: Lorine Schwab, DO   Date and time admitted to hospital: 9/20/2021 10:53 AM    * Acute respiratory failure with hypoxia [facility])  Assessment & Plan  Patient presented with acute respiratory failure with hypoxia due to pneumonia due to COVID-19  Pulse ox is 90% on room air  Currently requiring 2 L of oxygen due to tachypnea and hypoxia symptoms  Chest x-ray shows COVID-19 pneumonia  Try to wean off oxygen completely today    Pneumonia due to COVID-19 virus  Assessment & Plan  Patient has pneumonia due to COVID-19  Initially diagnosed with COVID-19 on September 8, 2021  Patient is unvaccinated  Will place on mild disease pathway with IV steroids, weight based anticoagulation and supportive care  Try to wean off oxygen if possible  Not candidate for convalescent plasma as symptoms are more than 5 days  Will downgrade to DVT prophylaxis dose for anticoagulation as patient is now weaned off to room air    Acute cystitis without hematuria  Assessment & Plan  UA is abnormal   Await urine culture results  Discontinue ceftriaxone and placed on a 3 day course of oral Keflex    Severe protein-calorie malnutrition ([facility] [place] [address] )  Assessment & Plan  Malnutrition Findings:   Adult Malnutrition type: Acute illness (related to COVID19, decreased appetite as evidenced by < 50% energy intake > 5 days, 4 7% wt loss x 1 5 weeks (9/8/21 215lb, 9/20/21 205lb)  )  Adult Degree of Malnutrition: Other severe protein calorie malnutrition (Treated with: Regular diet, ensure enlive daily, daily weights)    BMI Findings: Body mass index is 26 35 kg/m²  Encourage oral intake  Patient has lost weight secondary to COVID-19 infection diarrhea    Hyponatremia  Assessment & Plan  Secondary to decreased oral intake and dehydration    Placed on IV fluid hydration and observe for now  Improved  Discontinue IV fluids      VTE Pharmacologic Prophylaxis:   Pharmacologic: Enoxaparin (Lovenox)  Mechanical VTE Prophylaxis in Place: Yes    Patient Centered Rounds: I have performed bedside rounds with nursing staff today  Discussions with Specialists or Other Care Team Provider:  Case management    Education and Discussions with Family / Patient:  Discussed with patient bedside and update family    Time Spent for Care: 30 minutes  More than 50% of total time spent on counseling and coordination of care as described above  Current Length of Stay: 1 day(s)    Current Patient Status: Inpatient   Certification Statement: The patient will continue to require additional inpatient hospital stay due to COVID-19 pneumonia    Discharge Plan:  Pending progress    Code Status: Level 1 - Full Code      Subjective:   Patient denies any chest pain or shortness of breath or abdominal pain  He feels better compared to yesterday  Eating better  No diarrhea reported  Trying to wean off oxygen    Objective:     Vitals:   Temp (24hrs), Av 2 °F (37 3 °C), Min:97 3 °F (36 3 °C), Max:103 °F (39 4 °C)    Temp:  [97 3 °F (36 3 °C)-103 °F (39 4 °C)] 97 5 °F (36 4 °C)  HR:  [] 85  Resp:  [16-20] 18  BP: (115-145)/(50-74) 126/73  SpO2:  [90 %-95 %] 93 %  Body mass index is 26 35 kg/m²  Input and Output Summary (last 24 hours): Intake/Output Summary (Last 24 hours) at 2021 9171  Last data filed at 2021 0919  Gross per 24 hour   Intake 1490 ml   Output --   Net 1490 ml       Physical Exam:     Physical Exam  Vitals and nursing note reviewed  Constitutional:       Comments: Severe generalized muscle wasting noted   HENT:      Head: Normocephalic and atraumatic  Right Ear: External ear normal       Left Ear: External ear normal       Nose: Nose normal       Mouth/Throat:      Pharynx: Oropharynx is clear  Eyes:      Pupils: Pupils are equal, round, and reactive to light  Cardiovascular:      Rate and Rhythm: Normal rate and regular rhythm  Heart sounds: Normal heart sounds  Pulmonary:      Effort: Pulmonary effort is normal       Comments: Moderate air entry bilaterally with decreased breath sounds on the bases  Abdominal:      General: Bowel sounds are normal       Palpations: Abdomen is soft  Tenderness: There is no abdominal tenderness  Musculoskeletal:         General: Normal range of motion  Cervical back: Normal range of motion and neck supple  Skin:     General: Skin is warm and dry  Capillary Refill: Capillary refill takes less than 2 seconds  Neurological:      General: No focal deficit present  Mental Status: He is alert and oriented to person, place, and time  Psychiatric:         Mood and Affect: Mood normal            Additional Data:     Labs:    Results from last 7 days   Lab Units 09/21/21  0500 09/20/21  1126   WBC Thousand/uL 4 41 5 49   HEMOGLOBIN g/dL 14 0 15 5   HEMATOCRIT % 42 3 44 9   PLATELETS Thousands/uL 197 180   LYMPHO PCT %  --  16   MONO PCT %  --  3*   EOS PCT %  --  0     Results from last 7 days   Lab Units 09/21/21  0500   SODIUM mmol/L 140   POTASSIUM mmol/L 3 6   CHLORIDE mmol/L 102   CO2 mmol/L 27   BUN mg/dL 15   CREATININE mg/dL 0 82   ANION GAP mmol/L 11   CALCIUM mg/dL 7 9*   ALBUMIN g/dL 2 3*   TOTAL BILIRUBIN mg/dL 0 38   ALK PHOS U/L 58   ALT U/L 56   AST U/L 56*   GLUCOSE RANDOM mg/dL 150*                 Results from last 7 days   Lab Units 09/20/21  1126   LACTIC ACID mmol/L 1 5           * I Have Reviewed All Lab Data Listed Above  * Additional Pertinent Lab Tests Reviewed: [address] Admission Reviewed    Imaging:    Imaging Reports Reviewed Today Include:  Chest x-ray  Imaging Personally Reviewed by Myself Includes:  Chest x-ray    Recent Cultures (last 7 days):     Results from last 7 days   Lab Units 09/20/21  1129 09/20/21  1126   BLOOD CULTURE  Received in Microbiology Lab  Culture in Progress  Received in Microbiology Lab  Culture in Progress  Last 24 Hours Medication List:   Current Facility-Administered Medications   Medication Dose Route Frequency Provider Last Rate    acetaminophen  650 mg Oral Q6H PRN Tricia De Los Santos MD      calcium carbonate  1,000 mg Oral Daily PRN Tricia De Los Santos MD      cephalexin  500 mg Oral Q12H [address] Tricia De Los Santos MD      dexamethasone  6 mg Intravenous Q24H Tricia De Los Santos MD      [START ON 9/22/2021] enoxaparin  40 mg Subcutaneous Q24H [address] Tricia De Los Santos MD      guaiFENesin  600 mg Oral Q12H [address] Tricia De Los Santos MD      ondansetron  4 mg Intravenous Q6H PRN Tricia De Los Santos MD      remdesivir  100 mg Intravenous Q24H Tricia De Los Santos MD      sodium chloride (PF)  3 mL Intravenous Q1H PRN Larisa Valencia MD          Today, Patient Was Seen By: Tricia De Los Santos MD    ** Please Note: Dictation voice to text software may have been used in the creation of this document   **

## 2021-09-21 NOTE — PLAN OF CARE
Problem: Nutrition/Hydration-ADULT  Goal: Nutrient/Hydration intake appropriate for improving, restoring or maintaining nutritional needs  Description: Monitor and assess patient's nutrition/hydration status for malnutrition  Collaborate with interdisciplinary team and initiate plan and interventions as ordered  Monitor patient's weight and dietary intake as ordered or per policy  Utilize nutrition screening tool and intervene as necessary  Determine patient's food preferences and provide high-protein, high-caloric foods as appropriate       INTERVENTIONS:  - Monitor oral intake, urinary output, labs, and treatment plans  - Assess nutrition and hydration status and recommend course of action  - Evaluate amount of meals eaten  - Assist patient with eating if necessary   - Allow adequate time for meals  - Recommend/ encourage appropriate diets, oral nutritional supplements, and vitamin/mineral supplements  - Order, calculate, and assess calorie counts as needed  - Recommend, monitor, and adjust tube feedings and TPN/PPN based on assessed needs  - Assess need for intravenous fluids  - Provide specific nutrition/hydration education as appropriate  - Include patient/family/caregiver in decisions related to nutrition  Outcome: Progressing     Problem: PAIN - ADULT  Goal: Verbalizes/displays adequate comfort level or baseline comfort level  Description: Interventions:  - Encourage patient to monitor pain and request assistance  - Assess pain using appropriate pain scale  - Administer analgesics based on type and severity of pain and evaluate response  - Implement non-pharmacological measures as appropriate and evaluate response  - Consider cultural and social influences on pain and pain management  - Notify physician/advanced practitioner if interventions unsuccessful or patient reports new pain  Outcome: Progressing       Problem: Knowledge Deficit  Goal: Patient/family/caregiver demonstrates understanding of disease process, treatment plan, medications, and discharge instructions  Description: Complete learning assessment and assess knowledge base    Interventions:  - Provide teaching at level of understanding  - Provide teaching via preferred learning methods  Outcome: Progressing

## 2021-09-22 VITALS
RESPIRATION RATE: 20 BRPM | SYSTOLIC BLOOD PRESSURE: 135 MMHG | BODY MASS INDEX: 26.34 KG/M2 | TEMPERATURE: 97.4 F | HEART RATE: 87 BPM | DIASTOLIC BLOOD PRESSURE: 83 MMHG | OXYGEN SATURATION: 95 % | HEIGHT: 74 IN | WEIGHT: 205.25 LBS

## 2021-09-22 LAB — PROCALCITONIN SERPL-MCNC: 0.38 NG/ML

## 2021-09-22 PROCEDURE — 99239 HOSP IP/OBS DSCHRG MGMT >30: CPT | Performed by: FAMILY MEDICINE

## 2021-09-22 PROCEDURE — 84145 PROCALCITONIN (PCT): CPT | Performed by: FAMILY MEDICINE

## 2021-09-22 RX ORDER — CEPHALEXIN 500 MG/1
500 CAPSULE ORAL EVERY 12 HOURS SCHEDULED
Qty: 8 CAPSULE | Refills: 0 | Status: SHIPPED | OUTPATIENT
Start: 2021-09-22 | End: 2021-09-26

## 2021-09-22 RX ORDER — ACETAMINOPHEN 325 MG/1
650 TABLET ORAL EVERY 6 HOURS PRN
Refills: 0
Start: 2021-09-22

## 2021-09-22 RX ORDER — DEXAMETHASONE 6 MG/1
6 TABLET ORAL
Qty: 7 TABLET | Refills: 0 | Status: SHIPPED | OUTPATIENT
Start: 2021-09-22 | End: 2021-09-29

## 2021-09-22 RX ADMIN — CEPHALEXIN 500 MG: 500 CAPSULE ORAL at 08:05

## 2021-09-22 RX ADMIN — ENOXAPARIN SODIUM 40 MG: 40 INJECTION SUBCUTANEOUS at 08:05

## 2021-09-22 RX ADMIN — DEXAMETHASONE SODIUM PHOSPHATE 6 MG: 4 INJECTION INTRA-ARTICULAR; INTRALESIONAL; INTRAMUSCULAR; INTRAVENOUS; SOFT TISSUE at 10:39

## 2021-09-22 RX ADMIN — REMDESIVIR 100 MG: 100 INJECTION, POWDER, LYOPHILIZED, FOR SOLUTION INTRAVENOUS at 10:41

## 2021-09-22 RX ADMIN — GUAIFENESIN 600 MG: 600 TABLET ORAL at 08:05

## 2021-09-22 NOTE — DISCHARGE SUMMARY
[address]  Discharge- Isabel Yen 1955, 73 y o  male MRN: [id number]  Unit/Bed#: MS Jp-Gael Encounter: [id number]  Primary Care Provider: Serene Donaldson DO   Date and time admitted to hospital: 9/20/2021 10:53 AM    * Acute respiratory failure with hypoxia [facility])  Assessment & Plan  Patient presented with acute respiratory failure with hypoxia due to pneumonia due to COVID-19  Pulse ox is 90% on room air  Currently requiring 2 L of oxygen due to tachypnea and hypoxia symptoms  Chest x-ray shows COVID-19 pneumonia  Weaned off oxygen completely    Pneumonia due to COVID-19 virus  Assessment & Plan  Patient has pneumonia due to COVID-19  Initially diagnosed with COVID-19 on September 8, 2021  Patient is unvaccinated  Will place on mild disease pathway with IV steroids, weight based anticoagulation and supportive care  Not candidate for convalescent plasma as symptoms are more than 5 days  Off oxygen  Will discharge home on course of dexamethasone    Acute cystitis without hematuria  Assessment & Plan  UA is abnormal   Await urine culture results  Discontinue ceftriaxone and placed on a 3 day course of oral Keflex    Severe protein-calorie malnutrition ([facility] [address] )  Assessment & Plan  Malnutrition Findings:   Adult Malnutrition type: Acute illness (related to COVID19, decreased appetite as evidenced by < 50% energy intake > 5 days, 4 7% wt loss x 1 5 weeks (9/8/21 215lb, 9/20/21 205lb)  )  Adult Degree of Malnutrition: Other severe protein calorie malnutrition (Treated with: Regular diet, ensure enlive daily, daily weights)    BMI Findings: Body mass index is 26 35 kg/m²  Encourage oral intake  Patient has lost weight secondary to COVID-19 infection diarrhea    Hyponatremia  Assessment & Plan  Secondary to decreased oral intake and dehydration  Placed on IV fluid hydration and observe for now  Improved    Discontinue IV fluids      Discharging Physician / Practitioner: Cristin Hughes MD  PCP: Shruthi Gumsan DO  Admission Date:   Admission Orders (From admission, onward)     Ordered        09/20/21 1311  Inpatient Admission  Once                   Discharge Date: 09/22/21    Medical Problems     Resolved Problems  Date Reviewed: 9/22/2021    None                Consultations During Hospital Stay:  · None    Procedures Performed:   · None     Significant Findings / Test Results:   X-ray chest 1 view portable    Result Date: 9/20/2021  Impression: Interval development of peripheral bilateral infiltrates suspicious for Covid pneumonia Workstation performed: [id number]     Incidental Findings:   · None     Test Results Pending at Discharge (will require follow up): · None     Outpatient Tests Requested:  · None    Complications:  None    Reason for Admission:  Shortness of breath    Hospital Course:     Jose Rafael Mullen is a 73 y o  male patient who originally presented to the hospital on 9/20/2021 due to progressively worsening shortness of breath  Patient has pneumonia due to COVID-19 infection and has been having diarrhea and poor appetite and lost 15-16 lb over the last 10 days  Came in with sepsis present on admission due to pneumonia due to COVID-19  Also requiring 2 L of oxygen  Now clinically improving and weaned off oxygen and sepsis has resolved  Will discharge home with course of dexamethasone        Please see above list of diagnoses and related plan for additional information  Condition at Discharge: good     Discharge Day Visit / Exam:     Subjective:  Patient states that he is feeling better now  Denies any shortness of breath or cough  Eating better    Diarrhea is resolved  Vitals: Blood Pressure: 135/83 (09/22/21 0759)  Pulse: 87 (09/22/21 0017)  Temperature: (!) 97 4 °F (36 3 °C) (09/22/21 0759)  Temp Source: Oral (09/21/21 0717)  Respirations: 20 (09/22/21 0759)  Height: 6' 2" (188 cm) (09/21/21 0916)  Weight - Scale: 93 1 kg (205 lb 4 oz) (09/20/21 1732)  SpO2: 95 % (09/22/21 0017)  Exam:   Physical Exam  Vitals and nursing note reviewed  Constitutional:       Appearance: Normal appearance  HENT:      Head: Normocephalic and atraumatic  Right Ear: External ear normal       Left Ear: External ear normal       Nose: Nose normal       Mouth/Throat:      Pharynx: Oropharynx is clear  Cardiovascular:      Rate and Rhythm: Normal rate and regular rhythm  Heart sounds: Normal heart sounds  Pulmonary:      Effort: Pulmonary effort is normal       Comments: Moderate air entry bilaterally with decreased breath sounds on the bases  Abdominal:      General: Bowel sounds are normal       Palpations: Abdomen is soft  Tenderness: There is no abdominal tenderness  Musculoskeletal:         General: Normal range of motion  Cervical back: Normal range of motion and neck supple  Skin:     General: Skin is warm and dry  Capillary Refill: Capillary refill takes less than 2 seconds  Neurological:      General: No focal deficit present  Mental Status: He is alert and oriented to person, place, and time  Psychiatric:         Mood and Affect: Mood normal          Discussion with Family:  Discussed with wife    Discharge instructions/Information to patient and family:   See after visit summary for information provided to patient and family  Provisions for Follow-Up Care:  See after visit summary for information related to follow-up care and any pertinent home health orders  Disposition:     Home    For Discharges to [facility] SNF:   · Not Applicable to this Patient - Not Applicable to this Patient    Planned Readmission:  None     Discharge Statement:  I spent 35 minutes discharging the patient  This time was spent on the day of discharge  I had direct contact with the patient on the day of discharge   Greater than 50% of the total time was spent examining patient, answering all patient questions, arranging and discussing plan of care with patient as well as directly providing post-discharge instructions  Additional time then spent on discharge activities  Discharge Medications:  See after visit summary for reconciled discharge medications provided to patient and family        ** Please Note: This note has been constructed using a voice recognition system **

## 2021-09-22 NOTE — NURSING NOTE
IV removed  Belongings reviewed  AVS reviewed with a verbalized understanding  Pt escorted via w/c by the PCA

## 2021-09-22 NOTE — ASSESSMENT & PLAN NOTE
Patient presented with acute respiratory failure with hypoxia due to pneumonia due to COVID-19  Pulse ox is 90% on room air  Currently requiring 2 L of oxygen due to tachypnea and hypoxia symptoms    Chest x-ray shows COVID-19 pneumonia  Weaned off oxygen completely

## 2021-09-22 NOTE — ASSESSMENT & PLAN NOTE
Patient has pneumonia due to COVID-19  Initially diagnosed with COVID-19 on September 8, 2021  Patient is unvaccinated  Will place on mild disease pathway with IV steroids, weight based anticoagulation and supportive care  Not candidate for convalescent plasma as symptoms are more than 5 days  Off oxygen    Will discharge home on course of dexamethasone

## 2021-09-22 NOTE — PLAN OF CARE
Problem: Potential for Falls  Goal: Patient will remain free of falls  Description: INTERVENTIONS:  - Educate patient/family on patient safety including physical limitations  - Instruct patient to call for assistance with activity   - Consult OT/PT to assist with strengthening/mobility   - Keep Call bell within reach  - Keep bed low and locked with side rails adjusted as appropriate  - Keep care items and personal belongings within reach  - Initiate and maintain comfort rounds  - Make Fall Risk Sign visible to staff  - Apply yellow socks and bracelet for high fall risk patients  - Consider moving patient to room near nurses station  Outcome: Progressing     Problem: Nutrition/Hydration-ADULT  Goal: Nutrient/Hydration intake appropriate for improving, restoring or maintaining nutritional needs  Description: Monitor and assess patient's nutrition/hydration status for malnutrition  Collaborate with interdisciplinary team and initiate plan and interventions as ordered  Monitor patient's weight and dietary intake as ordered or per policy  Utilize nutrition screening tool and intervene as necessary  Determine patient's food preferences and provide high-protein, high-caloric foods as appropriate       INTERVENTIONS:  - Monitor oral intake, urinary output, labs, and treatment plans  - Assess nutrition and hydration status and recommend course of action  - Evaluate amount of meals eaten  - Assist patient with eating if necessary   - Allow adequate time for meals  - Recommend/ encourage appropriate diets, oral nutritional supplements, and vitamin/mineral supplements  - Order, calculate, and assess calorie counts as needed  - Recommend, monitor, and adjust tube feedings and TPN/PPN based on assessed needs  - Assess need for intravenous fluids  - Provide specific nutrition/hydration education as appropriate  - Include patient/family/caregiver in decisions related to nutrition  Outcome: Progressing     Problem: PAIN - ADULT  Goal: Verbalizes/displays adequate comfort level or baseline comfort level  Description: Interventions:  - Encourage patient to monitor pain and request assistance  - Assess pain using appropriate pain scale  - Administer analgesics based on type and severity of pain and evaluate response  - Implement non-pharmacological measures as appropriate and evaluate response  - Consider cultural and social influences on pain and pain management  - Notify physician/advanced practitioner if interventions unsuccessful or patient reports new pain  Outcome: Progressing     Problem: INFECTION - ADULT  Goal: Absence or prevention of progression during hospitalization  Description: INTERVENTIONS:  - Assess and monitor for signs and symptoms of infection  - Monitor lab/diagnostic results  - Monitor all insertion sites, i e  indwelling lines, tubes, and drains  - Monitor endotracheal if appropriate and nasal secretions for changes in amount and color  - [place] appropriate cooling/warming therapies per order  - Administer medications as ordered  - Instruct and encourage patient and family to use good hand hygiene technique  - Identify and instruct in appropriate isolation precautions for identified infection/condition  Outcome: Progressing  Goal: Absence of fever/infection during neutropenic period  Description: INTERVENTIONS:  - Monitor WBC    Outcome: Progressing     Problem: SAFETY ADULT  Goal: Patient will remain free of falls  Description: INTERVENTIONS:  - Educate patient/family on patient safety including physical limitations  - Instruct patient to call for assistance with activity   - Consult OT/PT to assist with strengthening/mobility   - Keep Call bell within reach  - Keep bed low and locked with side rails adjusted as appropriate  - Keep care items and personal belongings within reach  - Initiate and maintain comfort rounds  - Make Fall Risk Sign visible to staff  - Apply yellow socks and bracelet for high fall risk patients  - Consider moving patient to room near nurses station  Outcome: Progressing  Goal: Maintain or return to baseline ADL function  Description: INTERVENTIONS:  -  Assess patient's ability to carry out ADLs; assess patient's baseline for ADL function and identify physical deficits which impact ability to perform ADLs (bathing, care of mouth/teeth, toileting, grooming, dressing, etc )  - Assess/evaluate cause of self-care deficits   - Assess range of motion  - Assess patient's mobility; develop plan if impaired  - Assess patient's need for assistive devices and provide as appropriate  - Encourage maximum independence but intervene and supervise when necessary  - Involve family in performance of ADLs  - Assess for home care needs following discharge   - Consider OT consult to assist with ADL evaluation and planning for discharge  - Provide patient education as appropriate  Outcome: Progressing  Goal: Maintains/Returns to pre admission functional level  Description: INTERVENTIONS:  - Perform BMAT or MOVE assessment daily    - Set and communicate daily mobility goal to care team and patient/family/caregiver     - Collaborate with rehabilitation services on mobility goals if consulted  - Out of bed for toileting  - Record patient progress and toleration of activity level   Outcome: Progressing     Problem: DISCHARGE PLANNING  Goal: Discharge to home or other facility with appropriate resources  Description: INTERVENTIONS:  - Identify barriers to discharge w/patient and caregiver  - Arrange for needed discharge resources and transportation as appropriate  - Identify discharge learning needs (meds, wound care, etc )  - Arrange for interpretive services to assist at discharge as needed  - Refer to Case Management Department for coordinating discharge planning if the patient needs post-hospital services based on physician/advanced practitioner order or complex needs related to functional status, cognitive ability, or social support system  Outcome: Progressing     Problem: Knowledge Deficit  Goal: Patient/family/caregiver demonstrates understanding of disease process, treatment plan, medications, and discharge instructions  Description: Complete learning assessment and assess knowledge base    Interventions:  - Provide teaching at level of understanding  - Provide teaching via preferred learning methods  Outcome: Progressing

## 2021-09-22 NOTE — PLAN OF CARE
Problem: Nutrition/Hydration-ADULT  Goal: Nutrient/Hydration intake appropriate for improving, restoring or maintaining nutritional needs  Description: Monitor and assess patient's nutrition/hydration status for malnutrition  Collaborate with interdisciplinary team and initiate plan and interventions as ordered  Monitor patient's weight and dietary intake as ordered or per policy  Utilize nutrition screening tool and intervene as necessary  Determine patient's food preferences and provide high-protein, high-caloric foods as appropriate       INTERVENTIONS:  - Monitor oral intake, urinary output, labs, and treatment plans  - Assess nutrition and hydration status and recommend course of action  - Evaluate amount of meals eaten  - Assist patient with eating if necessary   - Allow adequate time for meals  - Recommend/ encourage appropriate diets, oral nutritional supplements, and vitamin/mineral supplements  - Order, calculate, and assess calorie counts as needed  - Recommend, monitor, and adjust tube feedings and TPN/PPN based on assessed needs  - Assess need for intravenous fluids  - Provide specific nutrition/hydration education as appropriate  - Include patient/family/caregiver in decisions related to nutrition  Outcome: Progressing     Problem: INFECTION - ADULT  Goal: Absence or prevention of progression during hospitalization  Description: INTERVENTIONS:  - Assess and monitor for signs and symptoms of infection  - Monitor lab/diagnostic results  - Monitor all insertion sites, i e  indwelling lines, tubes, and drains  - Monitor endotracheal if appropriate and nasal secretions for changes in amount and color  - [place] appropriate cooling/warming therapies per order  - Administer medications as ordered  - Instruct and encourage patient and family to use good hand hygiene technique  - Identify and instruct in appropriate isolation precautions for identified infection/condition  Outcome: Progressing Problem: Knowledge Deficit  Goal: Patient/family/caregiver demonstrates understanding of disease process, treatment plan, medications, and discharge instructions  Description: Complete learning assessment and assess knowledge base    Interventions:  - Provide teaching at level of understanding  - Provide teaching via preferred learning methods  Outcome: Progressing

## 2021-09-22 NOTE — CASE MANAGEMENT
Case Management Progress Note    Patient name Marylu Cuellar  Location /-99 MRN [id number]  : 1955 Date 2021       LOS (days): 2  Geometric Mean LOS (GMLOS) (days): 4 80  Days to GMLOS:2 9        OBJECTIVE:  Bundle(if applicable):    Current admission status: Inpatient  Preferred Pharmacy:   [address] [address]  Phone: [phone] Fax: [phone]    [id number] [id number]  Phone: [phone] Fax: [phone]    Primary Care Provider: Walker Dubon DO    Primary Insurance: BLUE CROSS  Secondary Insurance: MEDICARE    PROGRESS NOTE:    Discussed in am huddle patient MD discharging patient today  No needs identified

## 2021-09-25 LAB
BACTERIA BLD CULT: NORMAL
BACTERIA BLD CULT: NORMAL

## 2022-10-12 PROBLEM — J12.82 PNEUMONIA DUE TO COVID-19 VIRUS: Status: RESOLVED | Noted: 2021-09-20 | Resolved: 2022-10-12

## 2022-10-12 PROBLEM — U07.1 PNEUMONIA DUE TO COVID-19 VIRUS: Status: RESOLVED | Noted: 2021-09-20 | Resolved: 2022-10-12

## 2022-10-12 PROBLEM — N30.00 ACUTE CYSTITIS WITHOUT HEMATURIA: Status: RESOLVED | Noted: 2021-09-20 | Resolved: 2022-10-12

## 2025-06-20 ENCOUNTER — HOSPITAL ENCOUNTER (OUTPATIENT)
Dept: RADIOLOGY | Facility: CLINIC | Age: 70
End: 2025-06-20
Attending: ORTHOPAEDIC SURGERY
Payer: COMMERCIAL

## 2025-06-20 VITALS — BODY MASS INDEX: 29.31 KG/M2 | WEIGHT: 228.4 LBS | HEIGHT: 74 IN

## 2025-06-20 DIAGNOSIS — G89.29 CHRONIC PAIN OF RIGHT KNEE: Primary | ICD-10-CM

## 2025-06-20 DIAGNOSIS — M17.11 PRIMARY OSTEOARTHRITIS OF RIGHT KNEE: ICD-10-CM

## 2025-06-20 DIAGNOSIS — M25.561 CHRONIC PAIN OF RIGHT KNEE: ICD-10-CM

## 2025-06-20 DIAGNOSIS — M25.561 CHRONIC PAIN OF RIGHT KNEE: Primary | ICD-10-CM

## 2025-06-20 DIAGNOSIS — G89.29 CHRONIC PAIN OF RIGHT KNEE: ICD-10-CM

## 2025-06-20 PROCEDURE — 73564 X-RAY EXAM KNEE 4 OR MORE: CPT

## 2025-06-20 PROCEDURE — 99204 OFFICE O/P NEW MOD 45 MIN: CPT | Performed by: ORTHOPAEDIC SURGERY

## 2025-06-20 PROCEDURE — 20610 DRAIN/INJ JOINT/BURSA W/O US: CPT | Performed by: ORTHOPAEDIC SURGERY

## 2025-06-20 RX ORDER — BUPIVACAINE HYDROCHLORIDE 2.5 MG/ML
4 INJECTION, SOLUTION INFILTRATION; PERINEURAL
Status: COMPLETED | OUTPATIENT
Start: 2025-06-20 | End: 2025-06-20

## 2025-06-20 RX ORDER — TRIAMCINOLONE ACETONIDE 40 MG/ML
40 INJECTION, SUSPENSION INTRA-ARTICULAR; INTRAMUSCULAR
Status: COMPLETED | OUTPATIENT
Start: 2025-06-20 | End: 2025-06-20

## 2025-06-20 RX ADMIN — TRIAMCINOLONE ACETONIDE 40 MG: 40 INJECTION, SUSPENSION INTRA-ARTICULAR; INTRAMUSCULAR at 15:30

## 2025-06-20 RX ADMIN — BUPIVACAINE HYDROCHLORIDE 4 ML: 2.5 INJECTION, SOLUTION INFILTRATION; PERINEURAL at 15:30

## 2025-06-20 NOTE — PROGRESS NOTES
Name: Oziel Ta      : 1955      MRN: [id number]  Encounter Provider: Cabrera Maradiaga DO  Encounter Date: 2025   Encounter department: [facility] [place]  :  Assessment & Plan  Chronic pain of right knee    Orders:    XR knee 4+ vw right injury; Future    Primary osteoarthritis of right knee  Treatment options were discussed.  He elected to proceed with aspiration and injection of the right knee.  This was performed without difficulty and tolerated well.  Follow-up was discussed and he would prefer to return only if the symptoms do not improve.  I have encouraged him to contact me if questions or concerns arise.  He may continue to use over-the-counter analgesics.  I discussed a prescription but he would prefer to stick with over-the-counter analgesics.  He may ice the knee over the next 24 to 48 hours and I did recommend he try to minimize significant activity to help minimize the risk of recurrent effusion.       Large joint arthrocentesis: R knee    Performed by: Cabrera Maradiaga DO  Authorized by: Cabrera Maradiaga DO    Universal Protocol:  Consent: Verbal consent obtained  Consent given by: patient  Patient understanding: patient states understanding of the procedure being performed  Supporting Documentation  Indications: pain and joint swelling     Is this a Visco injection? NoProcedure Details  Location: knee - R knee  Needle size: 18 G  Ultrasound guidance: no  Approach: lateral  Medications administered: 4 mL bupivacaine 0.25 %; 40 mg triamcinolone acetonide 40 mg/mL    Aspirate amount: 25 mL  Aspirate: clear and yellow            History of Present Illness   HPI  Oziel Ta is a 69 y.o. male who presents for evaluation of his right knee.  He has noted onset of right knee pain about 4 to 6 weeks ago without injury.  He notes swelling and pain which has not improved, perhaps worsened.  He contacted his primary care physician who ordered an ultrasound.  He does  "recall having had blood clots in his right lower extremity after appendix surgery several years ago.  He denies radiation of pain distally or proximally.  He denies lower extremity paresthesias.  He has had no specific treatment and no x-rays of his knee.      Review of Systems       Objective   Ht 6' 2\" (1.88 m)   Wt 104 kg (228 lb 6.4 oz)   BMI 29.32 kg/m²      Physical Exam  The right knee exam demonstrates a moderate effusion.  He lacks a few degrees of terminal extension and has flexion to about 110 degrees with complaints of pain posteriorly during endrange flexion.  He has mild varus deformity to visual inspection which can be corrected toward physiologic valgus with valgus stress without complaints.  He has no laxity with varus stress.  Cruciate ligaments are stable although he did complain of mild pain with Lachman testing.  There is no crepitus noted.  The skin is warm and dry and there is no laceration abrasion noted.  The remainder of the lower extremity exam is benign.    X-rays of the knee obtained today demonstrate nearly complete loss of medial joint space as well as subchondral sclerosis and osteophytes.  There are lateral and patellofemoral osteophytes noted as well.    "

## 2025-06-20 NOTE — ASSESSMENT & PLAN NOTE
Treatment options were discussed.  He elected to proceed with aspiration and injection of the right knee.  This was performed without difficulty and tolerated well.  Follow-up was discussed and he would prefer to return only if the symptoms do not improve.  I have encouraged him to contact me if questions or concerns arise.  He may continue to use over-the-counter analgesics.  I discussed a prescription but he would prefer to stick with over-the-counter analgesics.  He may ice the knee over the next 24 to 48 hours and I did recommend he try to minimize significant activity to help minimize the risk of recurrent effusion.

## 2025-07-16 ENCOUNTER — OFFICE VISIT (OUTPATIENT)
Dept: OBGYN CLINIC | Facility: CLINIC | Age: 70
End: 2025-07-16
Payer: COMMERCIAL

## 2025-07-16 VITALS — WEIGHT: 224 LBS | HEIGHT: 74 IN | BODY MASS INDEX: 28.75 KG/M2

## 2025-07-16 DIAGNOSIS — G89.29 CHRONIC PAIN OF RIGHT KNEE: ICD-10-CM

## 2025-07-16 DIAGNOSIS — M25.561 CHRONIC PAIN OF RIGHT KNEE: ICD-10-CM

## 2025-07-16 DIAGNOSIS — M17.11 PRIMARY OSTEOARTHRITIS OF RIGHT KNEE: Primary | ICD-10-CM

## 2025-07-16 PROCEDURE — 99213 OFFICE O/P EST LOW 20 MIN: CPT | Performed by: ORTHOPAEDIC SURGERY

## 2025-07-16 RX ORDER — GLUCOSAM/CHON-MSM1/C/MANG/BOSW 750-644 MG
TABLET ORAL
COMMUNITY
Start: 2025-06-26

## 2025-07-16 NOTE — ASSESSMENT & PLAN NOTE
His treatment options were discussed.  He would like to proceed with use of a medial  brace but also would like to try a viscosupplement.  He plans to work for another year and a half and would like to be able to avoid any surgical intervention until he is able to retire.  I will see him once the viscosupplement has been approved.  Use of the knee  brace was discussed.  He will contact me if any questions or concerns arise.  Orders:    Injection procedure prior authorization; Future    Medial  Knee Brace

## 2025-07-16 NOTE — PROGRESS NOTES
"Name: Oziel Ta      : 1955      MRN: [id number]  Encounter Provider: Cabrera Maradiaga DO  Encounter Date: 2025   Encounter department: [facility] [place]  :  Assessment & Plan  Primary osteoarthritis of right knee  His treatment options were discussed.  He would like to proceed with use of a medial  brace but also would like to try a viscosupplement.  He plans to work for another year and a half and would like to be able to avoid any surgical intervention until he is able to retire.  I will see him once the viscosupplement has been approved.  Use of the knee  brace was discussed.  He will contact me if any questions or concerns arise.  Orders:    Injection procedure prior authorization; Future    Medial  Knee Brace    Chronic pain of right knee             History of Present Illness   HPI  Oziel Ta is a 69 y.o. male who presents for reevaluation of his right knee.  He underwent corticosteroid injection about 1 month ago with associated aspiration of an effusion.  His pain responded to the injection for about a week but then returned.  Swelling returned about a week ago although it is not as severe.  He rates his pain at 7/10 when it is at its worst.  He has difficulty sleeping.  He denies pain with rest.  He notes activity pain, increasing pain with climbing stairs.  He occasionally has a feeling of instability.      Review of Systems       Objective   Ht 6' 2\" (1.88 m)   Wt 102 kg (224 lb)   BMI 28.76 kg/m²      Physical Exam  The exam today demonstrates full extension with complaints of posterior pain at the knee and the proximal third of the lower leg.  He did complain of some pain radiating distally toward his ankle and dorsal foot pain.  However, sitting root sign was negative and symptoms were not exacerbated by passive ankle dorsiflexion with the knee in extension.  He has flexion to about 110 degrees with complaints of pain.  He has " tenderness along the medial knee including the medial femoral condyle and joint line.  The tibial plateau was nontender.  The anterior and lateral knee were nontender.  He has varus deformity which can be correctable to a neutral with valgus stress without complaints.    X-rays were again reviewed demonstrating nearly complete loss of medial joint space, subchondral sclerosis and osteophytes.

## 2025-08-04 ENCOUNTER — PROCEDURE VISIT (OUTPATIENT)
Dept: OBGYN CLINIC | Facility: CLINIC | Age: 70
End: 2025-08-04
Payer: COMMERCIAL

## 2025-08-04 VITALS — BODY MASS INDEX: 28.75 KG/M2 | WEIGHT: 224 LBS | HEIGHT: 74 IN

## 2025-08-04 DIAGNOSIS — M17.11 PRIMARY OSTEOARTHRITIS OF RIGHT KNEE: Primary | ICD-10-CM

## 2025-08-04 DIAGNOSIS — M25.461 EFFUSION OF RIGHT KNEE: ICD-10-CM

## 2025-08-04 PROCEDURE — 20610 DRAIN/INJ JOINT/BURSA W/O US: CPT | Performed by: PHYSICIAN ASSISTANT

## 2025-08-04 RX ORDER — BUPIVACAINE HYDROCHLORIDE 2.5 MG/ML
4 INJECTION, SOLUTION INFILTRATION; PERINEURAL
Status: COMPLETED | OUTPATIENT
Start: 2025-08-04 | End: 2025-08-04

## 2025-08-04 RX ADMIN — BUPIVACAINE HYDROCHLORIDE 4 ML: 2.5 INJECTION, SOLUTION INFILTRATION; PERINEURAL at 15:30

## 2025-08-05 ENCOUNTER — DOCUMENTATION (OUTPATIENT)
Dept: OBGYN CLINIC | Facility: CLINIC | Age: 70
End: 2025-08-05

## 2025-08-19 ENCOUNTER — DOCUMENTATION (OUTPATIENT)
Dept: OBGYN CLINIC | Facility: CLINIC | Age: 70
End: 2025-08-19

## (undated) DEVICE — ETS45 RELOAD STANDARD 45MM: Brand: ENDOPATH

## (undated) DEVICE — [HIGH FLOW INSUFFLATOR,  DO NOT USE IF PACKAGE IS DAMAGED,  KEEP DRY,  KEEP AWAY FROM SUNLIGHT,  PROTECT FROM HEAT AND RADIOACTIVE SOURCES.]: Brand: PNEUMOSURE

## (undated) DEVICE — CHLORAPREP HI-LITE 26ML ORANGE

## (undated) DEVICE — ENDOPOUCH RETRIEVER SPECIMEN RETRIEVAL BAGS: Brand: ENDOPOUCH RETRIEVER

## (undated) DEVICE — INTENDED FOR TISSUE SEPARATION, AND OTHER PROCEDURES THAT REQUIRE A SHARP SURGICAL BLADE TO PUNCTURE OR CUT.: Brand: BARD-PARKER SAFETY BLADES SIZE 15, STERILE

## (undated) DEVICE — Device

## (undated) DEVICE — SUT VICRYL 4-0 PS-2 27 IN J426H

## (undated) DEVICE — ENDOPATH PNEUMONEEDLE INSUFFLATION NEEDLES WITH LUER LOCK CONNECTORS 120MM: Brand: ENDOPATH

## (undated) DEVICE — ALLENTOWN LAP CHOLE APP PACK: Brand: CARDINAL HEALTH

## (undated) DEVICE — SUT VICRYL 3-0 SH 27 IN J416H

## (undated) DEVICE — PENCIL ELECTROSURG E-Z CLEAN -0035H

## (undated) DEVICE — TROCAR: Brand: KII® SLEEVE

## (undated) DEVICE — SUT VICRYL 0 UR-6 27 IN J603H

## (undated) DEVICE — ENDOPATH ETS45 2.5MM RELOADS (VASCULAR/THIN): Brand: ENDOPATH

## (undated) DEVICE — LIGACLIP 10-M/L, 10MM ENDOSCOPIC ROTATING MULTIPLE CLIP APPLIERS: Brand: LIGACLIP

## (undated) DEVICE — GLOVE SRG BIOGEL ECLIPSE 7

## (undated) DEVICE — ADHESIVE SKIN HIGH VISCOSITY EXOFIN 1ML

## (undated) DEVICE — DRAPE EQUIPMENT RF WAND

## (undated) DEVICE — ADHESIVE SKN CLSR HISTOACRYL FLEX 0.5ML LF

## (undated) DEVICE — SUT VICRYL 4-0 PS-2 18 IN J496G

## (undated) DEVICE — ENDOPATH ETS-FLEX45 ARTICULATING ENDOSCOPIC LINEAR CUTTER, NO RELOAD: Brand: ENDOPATH

## (undated) DEVICE — IRRIG ENDO FLO TUBING

## (undated) DEVICE — GLOVE INDICATOR PI UNDERGLOVE SZ 7 BLUE

## (undated) DEVICE — TROCAR: Brand: KII FIOS FIRST ENTRY

## (undated) DEVICE — BLUE HEAT SCOPE WARMER

## (undated) DEVICE — PAD GROUNDING ADULT